# Patient Record
Sex: MALE | Race: WHITE | Employment: FULL TIME | URBAN - METROPOLITAN AREA
[De-identification: names, ages, dates, MRNs, and addresses within clinical notes are randomized per-mention and may not be internally consistent; named-entity substitution may affect disease eponyms.]

---

## 2017-02-14 ENCOUNTER — HOSPITAL ENCOUNTER (EMERGENCY)
Facility: HOSPITAL | Age: 22
Discharge: HOME | End: 2017-02-14
Attending: EMERGENCY MEDICINE
Payer: COMMERCIAL

## 2017-02-14 VITALS
BODY MASS INDEX: 32.32 KG/M2 | DIASTOLIC BLOOD PRESSURE: 74 MMHG | RESPIRATION RATE: 18 BRPM | HEIGHT: 65 IN | WEIGHT: 194 LBS | TEMPERATURE: 99 F | SYSTOLIC BLOOD PRESSURE: 138 MMHG | OXYGEN SATURATION: 100 % | HEART RATE: 81 BPM

## 2017-02-14 DIAGNOSIS — F32.A DEPRESSION, UNSPECIFIED DEPRESSION TYPE: Primary | ICD-10-CM

## 2017-02-14 LAB
ACETAMINOPHEN: <2 UG/ML (ref ?–2)
ALBUMIN SERPL-MCNC: 4.5 G/DL (ref 3.5–4.8)
ALP LIVER SERPL-CCNC: 90 U/L (ref 45–117)
ALT SERPL-CCNC: 20 U/L (ref 17–63)
AST SERPL-CCNC: 25 U/L (ref 15–41)
BARBITURATES URINE: NEGATIVE
BASOPHILS # BLD AUTO: 0.01 X10(3) UL (ref 0–0.1)
BASOPHILS NFR BLD AUTO: 0.1 %
BILIRUB SERPL-MCNC: 1 MG/DL (ref 0.1–2)
BUN BLD-MCNC: 10 MG/DL (ref 8–20)
CALCIUM BLD-MCNC: 9.6 MG/DL (ref 8.3–10.3)
CANNABINOID URINE: NEGATIVE
CHLORIDE: 102 MMOL/L (ref 101–111)
CO2: 28 MMOL/L (ref 22–32)
COCAINE URINE: NEGATIVE
CREAT BLD-MCNC: 1.09 MG/DL (ref 0.7–1.3)
EOSINOPHIL # BLD AUTO: 0.02 X10(3) UL (ref 0–0.3)
EOSINOPHIL NFR BLD AUTO: 0.2 %
ERYTHROCYTE [DISTWIDTH] IN BLOOD BY AUTOMATED COUNT: 12 % (ref 11.5–16)
ETHYL ALCOHOL: <3 MG/DL (ref ?–3)
GLUCOSE BLD-MCNC: 104 MG/DL (ref 70–99)
HCT VFR BLD AUTO: 48.1 % (ref 37–53)
HGB BLD-MCNC: 16.7 G/DL (ref 13–17)
IMMATURE GRANULOCYTE COUNT: 0.02 X10(3) UL (ref 0–1)
IMMATURE GRANULOCYTE RATIO %: 0.2 %
LYMPHOCYTES # BLD AUTO: 1.42 X10(3) UL (ref 0.9–4)
LYMPHOCYTES NFR BLD AUTO: 15.3 %
M PROTEIN MFR SERPL ELPH: 8.2 G/DL (ref 6.1–8.3)
MCH RBC QN AUTO: 29.1 PG (ref 27–33.2)
MCHC RBC AUTO-ENTMCNC: 34.7 G/DL (ref 31–37)
MCV RBC AUTO: 83.8 FL (ref 80–99)
MONOCYTES # BLD AUTO: 0.44 X10(3) UL (ref 0.1–0.6)
MONOCYTES NFR BLD AUTO: 4.7 %
NEUTROPHIL ABS PRELIM: 7.38 X10 (3) UL (ref 1.3–6.7)
NEUTROPHILS # BLD AUTO: 7.38 X10(3) UL (ref 1.3–6.7)
NEUTROPHILS NFR BLD AUTO: 79.5 %
OPIATE URINE: NEGATIVE
PCP URINE: NEGATIVE
PLATELET # BLD AUTO: 207 10(3)UL (ref 150–450)
POTASSIUM SERPL-SCNC: 4.3 MMOL/L (ref 3.6–5.1)
RBC # BLD AUTO: 5.74 X10(6)UL (ref 4.3–5.7)
RED CELL DISTRIBUTION WIDTH-SD: 36.4 FL (ref 35.1–46.3)
SALICYLATE: <1.7 MG/DL (ref ?–1.7)
SODIUM SERPL-SCNC: 137 MMOL/L (ref 136–144)
WBC # BLD AUTO: 9.3 X10(3) UL (ref 4–13)

## 2017-02-14 PROCEDURE — 80329 ANALGESICS NON-OPIOID 1 OR 2: CPT | Performed by: EMERGENCY MEDICINE

## 2017-02-14 PROCEDURE — 80307 DRUG TEST PRSMV CHEM ANLYZR: CPT | Performed by: EMERGENCY MEDICINE

## 2017-02-14 PROCEDURE — 99284 EMERGENCY DEPT VISIT MOD MDM: CPT

## 2017-02-14 PROCEDURE — 80346 BENZODIAZEPINES1-12: CPT | Performed by: EMERGENCY MEDICINE

## 2017-02-14 PROCEDURE — 99285 EMERGENCY DEPT VISIT HI MDM: CPT

## 2017-02-14 PROCEDURE — 80320 DRUG SCREEN QUANTALCOHOLS: CPT | Performed by: EMERGENCY MEDICINE

## 2017-02-14 PROCEDURE — 85025 COMPLETE CBC W/AUTO DIFF WBC: CPT | Performed by: EMERGENCY MEDICINE

## 2017-02-14 PROCEDURE — 80324 DRUG SCREEN AMPHETAMINES 1/2: CPT | Performed by: EMERGENCY MEDICINE

## 2017-02-14 PROCEDURE — 36415 COLL VENOUS BLD VENIPUNCTURE: CPT

## 2017-02-14 PROCEDURE — 80053 COMPREHEN METABOLIC PANEL: CPT | Performed by: EMERGENCY MEDICINE

## 2017-02-14 RX ORDER — LORAZEPAM 1 MG/1
1 TABLET ORAL ONCE
Status: COMPLETED | OUTPATIENT
Start: 2017-02-14 | End: 2017-02-14

## 2017-02-14 NOTE — ED NOTES
Pt becoming upset about waiting for assessment. Pt expressing frustration about missing school this morning, and what looks to be work as well. Pt states he is not suicidal and wants to leave.   Told pt I would let his nurse know and that the [facility] represent

## 2017-02-14 NOTE — ED PROVIDER NOTES
Patient Seen in: [facility] Emergency Department    History   Patient presents with:  Eval-P (psychiatric)    Stated Complaint: suicidal ideations    HPI    This is a 17-year-old male who arrives by paramedics the patient was at his psychiatrist's o SURGICAL HISTORY  right knee repair    Comment 2009    OTHER SURGICAL HISTORY  5/18/12    Comment right shoulder a post labral and capsular repair    OTHER SURGICAL HISTORY  5/6/16    Comment carpal tunnel left wrist       Medications :   temazepam 30 MG O skin 2 (two) times daily as needed. testosterone cypionate (DEPOTESTOTERONE) 200 MG/ML Intramuscular Oil,  Inject 0.5 mL into the muscle once a week.  Indications: Sex Reversal  Patient taking differently: Inject 0.6 mg into the muscle See Admin Instructi icterus. Oral mucosa Is wet. No facial trauma. The neck is supple. LUNGS: Clear to auscultation, there is no wheezing or retraction. No crackles. CV: Cardiovascular is regular without murmurs or rubs.     ABD: The abdomen is soft nondistended nont within normal limits. Salicylate, acetaminophen, alcohol was negative  , CBC was within normal limits drug screen negative. Except for amphetamines and benzos. The patient was seen by Lizbeth Flores .   The patient is not actively suicidal he wa

## 2017-02-14 NOTE — ED NOTES
Spoke to [place] at [facility], states it is still going to be a bit until patient will be seen. There are three ahead of him and he will probably be seen at 1300.

## 2017-02-14 NOTE — ED PROVIDER NOTES
Hilda Fry #[id number]  (16 year old M)        ED-C0-C0         Ricka Collet [facility] Counselor Signed  [facility] Comprehensive Assessment 2/14/2017  2:28 PM   Related encounter: Assessment from 2/14/2017 in [facility] [address] ADVISED MOM THAT PT HAS BEEN INCREASINGLY TALKING ABOUT SUIICDE.      Referral Source  Referral Source: [facility] Provider  Referral Source Info: Yadira Naylor MD:  Fermin Stanton MD  -  [facility]    Suicide Risk  Current/Recent Suicidal Ideation: Yes  Date or observed  Sleep Pattern: Difficulty falling asleep; Disturbed/interrupted sleep  Appetite Symptoms: Increased  Unplanned Weight Gain: Yes (comment)                 Current/Previous MH/CD Providers  Hospitalizations, Placements, Therapy, Detox: Yes  Andree Mccormick Systems: Family members  Living Arrangements: Parent(s) (W/ FATHER)  Type of Residence: Private residence    Abuse Assessment  Physical Abuse: Unable to assess  Verbal Abuse: Unable to assess  Sexual Abuse: Unable to assess  Neglect: Unable to assess  Does

## 2017-02-14 NOTE — ED INITIAL ASSESSMENT (HPI)
Per paramedics, pt was at his psychiatrists office and md was fearful that the patient was in danger of suicide. Upon pts arrival he is tearful, cooperative and denies wanting to commit suicide, but admits to standing by a train with thoughts of suicide las

## 2017-02-14 NOTE — ED NOTES
Went and spoke with the patient about his depression and gave him encouraging words so that he would not try to hurt himself.  He stated he was going to try to listen to all my advice and talk to someone if he was ever feeling so depressed that the idea of

## 2017-02-14 NOTE — ED NOTES
Pt is resting in the room and states he does not want anything to drink or eat he is just laying in the bed and looks very sad

## 2017-02-14 NOTE — ED NOTES
Spoke with Aspen Smith at [facility], the crisis counselor is on her way to assess the patient.

## 2017-02-14 NOTE — ED NOTES
Medicated the patient with PO ativan, will continue to monitor, VSS.  Still does not want anything to eat or drink but had some water with the PO ativan

## 2017-02-15 ENCOUNTER — NURSE ONLY (OUTPATIENT)
Dept: INTERNAL MEDICINE CLINIC | Facility: CLINIC | Age: 22
End: 2017-02-15

## 2017-02-15 DIAGNOSIS — Z11.1 PPD SCREENING TEST: Primary | ICD-10-CM

## 2017-02-15 LAB
AMPHETAMINE, URINE: >5000 NG/ML
METAMPHETAMINE, URINE: <200 NG/ML
METHYLENEDIOXYAMPHETAMINE: <200 NG/ML
METHYLENEDIOXYETHYLAMPHETAMINE: <200 NG/ML
METHYLENEDIOXYMETAMPHETAMINE: <200 NG/ML

## 2017-02-17 LAB
7-AMINOCLONAZEPAM, URINE: <5 NG/ML
ALPHA-HYDROXYALPRAZOLAM, URINE: 14 NG/ML
ALPHA-HYDROXYMIDAZOLAM, URINE: <20 NG/ML
ALPRAZOLAM, URINE: <5 NG/ML
CHLORDIAZEPOXIDE, URINE: <20 NG/ML
CLONAZEPAM, URINE: <5 NG/ML
DIAZEPAM, URINE: 22 NG/ML
LORAZEPAM, URINE: <20 NG/ML
MIDAZOLAM, URINE: <20 NG/ML
NORDIAZEPAM, URINE: <20 NG/ML
OXAZEPAM, URINE: 3826 NG/ML
TEMAZEPAM, URINE: >4000 NG/ML

## 2017-02-21 ENCOUNTER — NURSE ONLY (OUTPATIENT)
Dept: INTERNAL MEDICINE CLINIC | Facility: CLINIC | Age: 22
End: 2017-02-21

## 2017-02-21 DIAGNOSIS — Z11.1 SCREENING-PULMONARY TB: Primary | ICD-10-CM

## 2017-02-21 LAB — INDURATION (): 0 MM (ref 0–11)

## 2017-02-21 PROCEDURE — 86580 TB INTRADERMAL TEST: CPT | Performed by: INTERNAL MEDICINE

## 2017-02-23 ENCOUNTER — NURSE ONLY (OUTPATIENT)
Dept: INTERNAL MEDICINE CLINIC | Facility: CLINIC | Age: 22
End: 2017-02-23

## 2017-03-13 ENCOUNTER — LAB ENCOUNTER (OUTPATIENT)
Dept: LAB | Facility: HOSPITAL | Age: 22
End: 2017-03-13
Attending: FAMILY MEDICINE
Payer: COMMERCIAL

## 2017-03-13 DIAGNOSIS — E34.9 ENDOCRINE DISEASE: Primary | ICD-10-CM

## 2017-03-13 LAB
ALBUMIN SERPL-MCNC: 4 G/DL (ref 3.5–4.8)
ALP LIVER SERPL-CCNC: 77 U/L (ref 45–117)
ALT SERPL-CCNC: 14 U/L (ref 17–63)
AST SERPL-CCNC: 16 U/L (ref 15–41)
BASOPHILS # BLD AUTO: 0.01 X10(3) UL (ref 0–0.1)
BASOPHILS NFR BLD AUTO: 0.2 %
BILIRUB SERPL-MCNC: 1.4 MG/DL (ref 0.1–2)
BUN BLD-MCNC: 12 MG/DL (ref 8–20)
CALCIUM BLD-MCNC: 9.5 MG/DL (ref 8.3–10.3)
CHLORIDE: 104 MMOL/L (ref 101–111)
CHOLEST SMN-MCNC: 152 MG/DL (ref ?–190)
CO2: 30 MMOL/L (ref 22–32)
CREAT BLD-MCNC: 1.11 MG/DL (ref 0.7–1.3)
EOSINOPHIL # BLD AUTO: 0.04 X10(3) UL (ref 0–0.3)
EOSINOPHIL NFR BLD AUTO: 0.6 %
ERYTHROCYTE [DISTWIDTH] IN BLOOD BY AUTOMATED COUNT: 12.1 % (ref 11.5–16)
EST. AVERAGE GLUCOSE BLD GHB EST-MCNC: 94 MG/DL (ref 68–126)
ESTRADIOL: 33.9 PG/ML (ref 0–39.8)
GLUCOSE BLD-MCNC: 89 MG/DL (ref 70–99)
HBA1C MFR BLD HPLC: 4.9 % (ref ?–5.7)
HCT VFR BLD AUTO: 45.4 % (ref 37–53)
HDLC SERPL-MCNC: 51 MG/DL (ref 45–?)
HDLC SERPL: 2.98 {RATIO} (ref ?–4.97)
HGB BLD-MCNC: 15.7 G/DL (ref 13–17)
IMMATURE GRANULOCYTE COUNT: 0.01 X10(3) UL (ref 0–1)
IMMATURE GRANULOCYTE RATIO %: 0.2 %
LDLC SERPL CALC-MCNC: 88 MG/DL (ref ?–120)
LYMPHOCYTES # BLD AUTO: 1.96 X10(3) UL (ref 0.9–4)
LYMPHOCYTES NFR BLD AUTO: 31.2 %
M PROTEIN MFR SERPL ELPH: 7.4 G/DL (ref 6.1–8.3)
MCH RBC QN AUTO: 28.8 PG (ref 27–33.2)
MCHC RBC AUTO-ENTMCNC: 34.6 G/DL (ref 31–37)
MCV RBC AUTO: 83.3 FL (ref 80–99)
MONOCYTES # BLD AUTO: 0.42 X10(3) UL (ref 0.1–0.6)
MONOCYTES NFR BLD AUTO: 6.7 %
NEUTROPHIL ABS PRELIM: 3.84 X10 (3) UL (ref 1.3–6.7)
NEUTROPHILS # BLD AUTO: 3.84 X10(3) UL (ref 1.3–6.7)
NEUTROPHILS NFR BLD AUTO: 61.1 %
NONHDLC SERPL-MCNC: 101 MG/DL (ref ?–150)
PLATELET # BLD AUTO: 182 10(3)UL (ref 150–450)
POTASSIUM SERPL-SCNC: 4.2 MMOL/L (ref 3.6–5.1)
RBC # BLD AUTO: 5.45 X10(6)UL (ref 4.3–5.7)
RED CELL DISTRIBUTION WIDTH-SD: 36.1 FL (ref 35.1–46.3)
SODIUM SERPL-SCNC: 141 MMOL/L (ref 136–144)
TRIGLYCERIDES: 64 MG/DL (ref ?–115)
TSI SER-ACNC: 0.52 MIU/ML (ref 0.35–5.5)
VLDL: 13 MG/DL (ref 5–40)
WBC # BLD AUTO: 6.3 X10(3) UL (ref 4–13)

## 2017-03-13 PROCEDURE — 84402 ASSAY OF FREE TESTOSTERONE: CPT

## 2017-03-13 PROCEDURE — 36415 COLL VENOUS BLD VENIPUNCTURE: CPT

## 2017-03-13 PROCEDURE — 84403 ASSAY OF TOTAL TESTOSTERONE: CPT

## 2017-03-13 PROCEDURE — 82670 ASSAY OF TOTAL ESTRADIOL: CPT

## 2017-03-13 PROCEDURE — 83036 HEMOGLOBIN GLYCOSYLATED A1C: CPT

## 2017-03-13 PROCEDURE — 80053 COMPREHEN METABOLIC PANEL: CPT

## 2017-03-13 PROCEDURE — 84443 ASSAY THYROID STIM HORMONE: CPT

## 2017-03-13 PROCEDURE — 80061 LIPID PANEL: CPT

## 2017-03-13 PROCEDURE — 85025 COMPLETE CBC W/AUTO DIFF WBC: CPT

## 2017-03-19 LAB
TESTOSTERONE TOTAL: 597 NG/DL
TESTOSTERONE, FREE -MS/MS: 157 PG/ML

## 2017-03-28 ENCOUNTER — APPOINTMENT (OUTPATIENT)
Dept: GENERAL RADIOLOGY | Facility: HOSPITAL | Age: 22
End: 2017-03-28
Attending: EMERGENCY MEDICINE
Payer: COMMERCIAL

## 2017-03-28 PROCEDURE — 71010 XR CHEST AP PORTABLE  (CPT=71010): CPT

## 2017-03-28 NOTE — ED INITIAL ASSESSMENT (HPI)
Patient reported to his MD that he had taken 40 xanax tabs. EMS observed unsteady gait at the scene. Patient was combative with police. Patient arrived in handcuffs and leather restraints.  Security and [facility] police present

## 2017-03-28 NOTE — ED PROVIDER NOTES
Patient Seen in: [facility] Emergency Department    History   Patient presents with:  Eval-P (psychiatric)    Stated Complaint: Patient reported that he took 40 xanax tabs, medics observed unsteady gait.  Paco Peoples*    HPI    10-year-old male who comes in Javi Kern MD;  Location: [place] [address]  right shoulder repair    Comment 2010    OTHER SURGICAL HISTORY  right knee repair    Comment 2009    OTHER SURGICAL HISTORY  5/18/12    Comment right shoulder a post labral and caps mg by mouth every 6 (six) hours as needed for Pain. Diclofenac Sodium (PENNSAID) 2 % Transdermal Solution,  Place 20 mg onto the skin 2 (two) times daily as needed.    testosterone cypionate (DEPOTESTOTERONE) 200 MG/ML Intramuscular Oil,  Inject 0.5 mL in and rhythm  Lungs: Clear to auscultation bilaterally  Abdomen: Soft nontender nondistended normal active bowel sounds without rebound, guarding or masses noted  Back nontender without CVA tenderness  Extremity no clubbing, cyanosis or edema noted.   Full ra

## 2017-03-29 NOTE — ED NOTES
Patient is now calm, no resistance with restraint loosening. Redness was noticed so security present to assist with clothing removal and restraint loosening.

## 2017-03-29 NOTE — BH LEVEL OF CARE ASSESSMENT
Comprehensive Assessment Note   General Questions   Why are you here?: [facility] pt stated \"I don't know. \" After being asked about him making statements earlier about taking 40 xanax as a SI attempt pt stated he took 15 pill discharged from the hospital.   Family Collateral   Family Collateral: none available   Reason Patient is Here Today: na   Family's Biggest Areas of Concern: na   Referral Source   Referral Source:  [facility]: [facility]   Person/Contac Past Expectation: pt refused to discuss   Past Suicide Risk Mitigating Factors: pt refused to discuss   Past Suicide Risk Collateral Provided By[de-identified] na   Describe Past Suicide Risk Collateral: na   Danger to Others/Property   Current/Recent Harm Toward Other Self-Injurious Behaviors: hx of self-harm \"months ago\"   Present Self-Injurious Behaviors: No   Mental Health Symptoms   Hallucination Type: No problems reported or observed   Delusions: No problems reported or observed   Depression Symptoms: Feelings of Last Seen: 3 yrs ago           Current/Previous MH/CD Treatment   Recovery Support Groups: Denies Past History   History of Seclusion/Restraint: Yes (per previous assessment)   Addictions Screen   Do you sometimes drink beer, wine or other alcohol beverage Characteristics: Normal rate;Normal rhythm;Normal volume   Concentration: Unimpaired   Memory: Recent memory intact; Remote memory intact   Orientation Level: Oriented X4   Thought Characteristics: Alert; Coherent;Logical;Organized   Judgment: Poor (Comment) distress or anguish: Yes   5. Self-loathing: Yes   6. Hopelessness: Yes   7. Agitation: Yes   8. Psychosis: No   9. View of death: Yes   10. Severe relational or situational stressors: Yes   Patient History   11. Family/Peer Suicidal History: No   12.  Poor

## 2017-03-29 NOTE — ED NOTES
Restraints removed, patient more calm and cooperative.  [facility] notified of crisis eval

## 2017-03-30 PROBLEM — F33.2 MDD (MAJOR DEPRESSIVE DISORDER), RECURRENT EPISODE, SEVERE (HCC): Status: ACTIVE | Noted: 2017-03-30

## 2017-04-25 ENCOUNTER — OFFICE VISIT (OUTPATIENT)
Dept: INTERNAL MEDICINE CLINIC | Facility: CLINIC | Age: 22
End: 2017-04-25

## 2017-04-25 ENCOUNTER — TELEPHONE (OUTPATIENT)
Dept: INTERNAL MEDICINE CLINIC | Facility: CLINIC | Age: 22
End: 2017-04-25

## 2017-04-25 VITALS
WEIGHT: 191 LBS | RESPIRATION RATE: 16 BRPM | HEIGHT: 65 IN | HEART RATE: 64 BPM | TEMPERATURE: 98 F | BODY MASS INDEX: 31.82 KG/M2 | DIASTOLIC BLOOD PRESSURE: 72 MMHG | SYSTOLIC BLOOD PRESSURE: 128 MMHG

## 2017-04-25 DIAGNOSIS — F33.0 MILD EPISODE OF RECURRENT MAJOR DEPRESSIVE DISORDER (HCC): ICD-10-CM

## 2017-04-25 DIAGNOSIS — S09.90XA HEAD INJURY, ACUTE, INITIAL ENCOUNTER: Primary | ICD-10-CM

## 2017-04-25 DIAGNOSIS — IMO0002 INJURY, SELF-INFLICTED: ICD-10-CM

## 2017-04-25 PROCEDURE — 99213 OFFICE O/P EST LOW 20 MIN: CPT | Performed by: NURSE PRACTITIONER

## 2017-04-25 NOTE — TELEPHONE ENCOUNTER
Pt states that last night at 9pm he hit his forehead against the face and had some bleeding and bruising. Pt had a h/a after and nausea after and took ibuprofen otc that helped with h/a and bleeding stopped.    Pt denied LOC, lightheadedness, dizziness or v

## 2017-04-25 NOTE — PROGRESS NOTES
Patient presents with:  Head Injury: Pt states he \"hit head on fence\" last night- Pt denies any LOC, blurred vision or emesis, Pt states he did have several boughts of nausea ovenight      HPI:  Presents with 1 day history of self inflicted head injury. Active Problem List:     Chondromalacia of patella     Disorder of bursae and tendons in shoulder region     Superior glenoid labrum lesion     Other joint derangement, not elsewhere classified, shoulder region     Other specified disorders of rotator cuff haloperidol 2 MG Oral Tab Take 1 tablet (2 mg total) by mouth 3 (three) times daily. Disp: 45 tablet Rfl: 0   finasteride 5 MG Oral Tab Take 5 mg by mouth daily.  Disp:  Rfl:    LEVOTHYROXINE SODIUM 88 MCG Oral Tab TAKE 1 TABLET BEFORE BREAKFAST ON SUNDAY without exudate, bleeding or surrounding erythema. Abrasion surrounded by approx 6 cm circular area of edema which is soft-fluctuant, mildly tender, without erythema or bruising. Neuro: Cranial nerves II-XII intact.    Eyes: Conjunctivae are normal. PERRL

## 2017-04-27 RX ORDER — MONTELUKAST SODIUM 10 MG/1
TABLET ORAL
Qty: 90 TABLET | Refills: 0 | Status: SHIPPED | OUTPATIENT
Start: 2017-04-27 | End: 2017-08-07

## 2017-05-10 NOTE — BH LEVEL OF CARE ASSESSMENT
Level of Care Assessment Note  General Questions   Why are you here?: \"I saw Dr. Binh Becerra yesterday and he said he's going to a concert tonight and that I couldn't talk to him tonight so I got upset. He told me to talk to [facility] VICKY (therapist) and I did.  I have denies SI/HI. Family Collateral   Family Collateral: none available   Reason Patient is Here Today: na   Family's Biggest Areas of Concern: na   Referral Source   Referral Source:  Other [address]: [facility]   Person/Contact Name: Juju Shea to Others/Property   Current/Recent Harm Toward Others: No   Current/Recent Harm Toward Others Ideation: No   Current/Recent Harm Toward Others Plan : No   Current/Recent Harm Toward Others Intent: No   Current/Recent Harm Toward Others Rehearsal: No   Cur Severity: superficial   Date of Most Recent Occurence: (2 wks ago)   Mental Health Symptoms   Hallucination Type: No problems reported or observed   Delusions: No problems reported or observed   Depression Symptoms: Feelings of worthlessness; Feelings of ho help so he's seeing Quintin Medina; pt states he doesn't want a therapist but Dr. Frances Morgan wants him to   Prior [facility] Inpatient   Name: Yolande Alvares   Dates of Treatment: multiple x's; last admission was 3/29-4/03/17   Date Last Seen: 4/3/17   Reason: SI attempt   Prior LO being released from the hospital; pt states was falling behind before the last hospitalization and has \"been hard to bounce back\"; pt states doesn't have motivation and attention for about 3 months   Employment Status: Employed (pt states he has 3 jobs; Oriented X4   Thought Characteristics: Alert;Logical;Coherent;Organized   Judgment: Fair   Insight: Fair   Assessment Summary   Assessment Summary: Pt is a 23 yr old male who arrived to the ER via ambulance.  Pt has a hx of depression and borderline persona

## 2017-05-10 NOTE — ED INITIAL ASSESSMENT (HPI)
PT arrives with medics. Pt was on the phone with his therapist and made a comment that was threatening suicide. Pt then hung up the phone and therapist called 911. Pt denies SI at this time.

## 2017-05-10 NOTE — ED PROVIDER NOTES
Patient Seen in: [facility] Emergency Department    History   Patient presents with:  Eval-P (psychiatric)    Stated Complaint: eval p    HPI    Patient is a 77-year-old presenting for evaluation after making suicidal statements prior to arrival.  P right shoulder a post labral and capsular repair    OTHER SURGICAL HISTORY  5/6/16    Comment carpal tunnel left wrist       Medications :   amphetamine-dextroamphetamine (ADDERALL) 20 MG Oral Tab,  1 in AM, 1/2 noon   MONTELUKAST SODIUM 10 MG Oral Tab,  T Father    • OCD Father    • Mental Disorder Father      anxiety   • Heart Disorder Father    • Cancer Mother      thyroid   • Mental Disorder Mother      anxiety   • Anxiety Mother    • Anxiety Maternal Grandmother    • Anxiety Maternal Grandfather    • Bi The patient moves all 4 extremities freely. No cyanosis, clubbing, or edema. NEUROLOGIC: The patient is awake, alert, and oriented x3. Cranial nerves are grossly intact. There is no gross motor or sensory deficits identified.   PSYCH: Patient admits to m

## 2017-06-30 ENCOUNTER — TELEPHONE (OUTPATIENT)
Dept: INTERNAL MEDICINE CLINIC | Facility: CLINIC | Age: 22
End: 2017-06-30

## 2017-07-07 ENCOUNTER — TELEPHONE (OUTPATIENT)
Dept: INTERNAL MEDICINE CLINIC | Facility: CLINIC | Age: 22
End: 2017-07-07

## 2017-07-07 DIAGNOSIS — Z23 NEED FOR TDAP VACCINATION: Primary | ICD-10-CM

## 2017-07-07 NOTE — TELEPHONE ENCOUNTER
LOV 12/22/16 JV    Pt was seen at ED 5/9/17 - no f/u appt     Last Tdap 8/31/16- Orders pended for approval

## 2017-07-14 ENCOUNTER — NURSE ONLY (OUTPATIENT)
Dept: INTERNAL MEDICINE CLINIC | Facility: CLINIC | Age: 22
End: 2017-07-14

## 2017-07-14 PROCEDURE — 90471 IMMUNIZATION ADMIN: CPT | Performed by: NURSE PRACTITIONER

## 2017-07-14 PROCEDURE — 90715 TDAP VACCINE 7 YRS/> IM: CPT | Performed by: NURSE PRACTITIONER

## 2017-09-22 NOTE — BH PROGRESS NOTE
Dr. Sunday Desai called and let this writer know about doing a wellness check due to pt calling making suicidal threats and having a knife.  Called 158, [place] police dept transferred to [address] and at pt's home when got off the phone with this wri

## 2017-11-20 ENCOUNTER — LAB ENCOUNTER (OUTPATIENT)
Dept: LAB | Age: 22
End: 2017-11-20
Attending: INTERNAL MEDICINE
Payer: COMMERCIAL

## 2017-11-20 DIAGNOSIS — E06.3 CHRONIC LYMPHOCYTIC THYROIDITIS: ICD-10-CM

## 2017-11-20 DIAGNOSIS — E34.9 ENDOCRINE DISEASE: Primary | ICD-10-CM

## 2017-11-20 PROCEDURE — 84439 ASSAY OF FREE THYROXINE: CPT

## 2017-11-20 PROCEDURE — 85025 COMPLETE CBC W/AUTO DIFF WBC: CPT

## 2017-11-20 PROCEDURE — 36415 COLL VENOUS BLD VENIPUNCTURE: CPT

## 2017-11-20 PROCEDURE — 82670 ASSAY OF TOTAL ESTRADIOL: CPT

## 2017-11-20 PROCEDURE — 84403 ASSAY OF TOTAL TESTOSTERONE: CPT

## 2017-11-20 PROCEDURE — 84443 ASSAY THYROID STIM HORMONE: CPT

## 2017-12-05 NOTE — BH LEVEL OF CARE ASSESSMENT
Level of Care Assessment Note    General Questions  Why are you here?: Pt is a 23 yr old male who arrived to [facility] via ambulance due to reporting SI statements in his therapist's office.  Pt states \"I'm pissed at [facility] VICKY (therapist) for a week now and he active. 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 Pt states he wants to go home because he has homework to do. Pt works part-time and denies issues at work.  Pt states he has been working more Ideation: 12/04/17  Describe Current/Recent Suicidal Ideation: Pt states he's been feeling more suicidal now but not active. // Pt's therapist called 911 due to pt making SI statements at the therapist's office. Pt reported SI with plan to slit his wrists. and was sent a letter re: the arrest in May 2017  Past Harm Toward Others Ideation: No  Past Harm Toward Others Plan: No  Past Harm Toward Others Intent: No  Past Harm Toward Others Rehearsal: No  Past Harm Toward Others Threat/Attempt: Yes  Date of Past H off  Object(s) Used:  Other (comment)  Area(s) of Body Injured: Leg  Describe Area(s) of Body Injured: leg  Frequency: Other (comment)  Describe Frequency: per previous assessment, frequency varies  Severity: Superficial  Describe Severity: superficial  Dominic (comment)  Unplanned Weight Gain: No  History of Eating Disorder: No  Active Eating Disorder: No                 Current/Previous MH/CD Providers  Hospitalizations, Placements, Therapy, Detox: Yes  Current OP Psychiatrist  Current OP Psychiatrist: Dr. Nat Tanner before he can continue with this major  Employment Status: Employed (part-time)  Job Issues:  Other (comment) (pt denies issues )  Concerns/Conflicts with Social Relationships: Yes  Describe Concerns/Conflicts with Social Relationships[de-identified] pt reports arguing they were arguing. Pt states he told his therapist \"I said don't worry if I try to kill myself\" and \"I told him I wouldn't overdose, I'd slit my wrists. \" Pt denies active SI and states he wants to go home because he has homework and has to work tomorro No

## 2017-12-05 NOTE — ED INITIAL ASSESSMENT (HPI)
Pt to ED brought by EMS for alejandro rodriguez. Per EMS report, Pt was seen by Psychiatrist this PM and EMS and [place] PONCE responded to the office after Pt verbalized that he \"wants to harm himself / kill himself by cutting his wrists\".  Pt denies this at this

## 2017-12-05 NOTE — ED NOTES
PATIENT'S BELONGINGS IN BAG [id number]. INCLUDE A CELL PHONE AND A WALLET. ALSO A BACKPACK UNDERNEATH A POD DESK.

## 2017-12-05 NOTE — ED PROVIDER NOTES
Patient Seen in: [facility] Emergency Department    History   Patient presents with:  Eval-P (psychiatric)    Stated Complaint: eval P    HPI    Patient presents to ER with the history per paramedics/police officers that they were called to respond wrist  5/6/2016: [facility] N/CARPAL TUNNEL SURG Left      Comment: Procedure: CARPAL TUNNEL RELEASE;  Surgeon:                Cris Mijares MD;  Location: [address] [place] [address],[address]        Smoking status: Never Smoker case was discussed with his psychiatrist who knows him very well and they recommended that he follow-up as an outpatient.   In our best estimation this patient is a reasonable risk for discharge and close outpatient follow-up, per the psychiatrist who knows

## 2017-12-05 NOTE — ED NOTES
Pt requesting to get his homework to he can work on it. Relayed to Pt that per ER MD, Pt can't have anything at this time (homework). Pt insisting he needs to do his homework since he has school in the morning. \"Can I just do it in my laptop? \" Pt was not

## 2018-03-06 ENCOUNTER — OFFICE VISIT (OUTPATIENT)
Dept: INTERNAL MEDICINE CLINIC | Facility: CLINIC | Age: 23
End: 2018-03-06

## 2018-03-06 VITALS
WEIGHT: 197 LBS | RESPIRATION RATE: 16 BRPM | HEIGHT: 65 IN | TEMPERATURE: 98 F | HEART RATE: 80 BPM | BODY MASS INDEX: 32.82 KG/M2 | SYSTOLIC BLOOD PRESSURE: 130 MMHG | DIASTOLIC BLOOD PRESSURE: 80 MMHG

## 2018-03-06 DIAGNOSIS — J45.20 MILD INTERMITTENT ASTHMA WITHOUT COMPLICATION: Primary | ICD-10-CM

## 2018-03-06 PROCEDURE — 99213 OFFICE O/P EST LOW 20 MIN: CPT | Performed by: INTERNAL MEDICINE

## 2018-03-06 RX ORDER — MONTELUKAST SODIUM 10 MG/1
10 TABLET ORAL DAILY
Qty: 90 TABLET | Refills: 3 | Status: SHIPPED | OUTPATIENT
Start: 2018-03-06 | End: 2019-04-01

## 2018-03-06 RX ORDER — DEXTROAMPHETAMINE SACCHARATE, AMPHETAMINE ASPARTATE, DEXTROAMPHETAMINE SULFATE AND AMPHETAMINE SULFATE 3.75; 3.75; 3.75; 3.75 MG/1; MG/1; MG/1; MG/1
TABLET ORAL DAILY
COMMUNITY
End: 2018-03-06 | Stop reason: CLARIF

## 2018-03-06 RX ORDER — TESTOSTERONE CYPIONATE 200 MG/ML
INJECTION INTRAMUSCULAR
Refills: 3 | COMMUNITY
Start: 2018-01-03

## 2018-03-06 RX ORDER — ALBUTEROL SULFATE 90 UG/1
2 AEROSOL, METERED RESPIRATORY (INHALATION) EVERY 4 HOURS PRN
Qty: 2 INHALER | Refills: 0 | Status: SHIPPED | OUTPATIENT
Start: 2018-03-06 | End: 2019-01-08

## 2018-03-06 NOTE — PROGRESS NOTES
Patient presents with:  Medication Follow-Up: AB RM 9, pt states having asthma attack       HPI:  Her efor f/u from asthma exacerbation which sarted last week, ran out of singulair and inhlaer. Notes wheezing, cough, now improved. No f/c/chest pain or sob. DERRICK/CHRISTA/  /NSC/RIGHT CARPAL TUNNEL RELEASE / DOS 12/13/16 / EXP 03/13/17     Left carpal tunnel syndrome     Major depression     Infectious mononucleosis without complication     [facility]     Bipolar 1 disorde Disp:  Rfl:    Albuterol Sulfate (VENTOLIN) (2.5 MG/3ML) 0.083% Inhalation Nebu Soln Take 3 mL (2.5 mg total) by nebulization every 6 (six) hours as needed for Wheezing.  Disp: 100 vial Rfl: 1   ibuprofen (MOTRIN) 600 MG Oral Tab Take 600 mg by mouth every Inhale 2 puffs into the lungs every 4 (four) hours as needed for Wheezing. Montelukast Sodium (SINGULAIR) 10 MG Oral Tab 90 tablet 3      Sig: Take 1 tablet (10 mg total) by mouth daily. Imaging & Consults:  None    No Follow-up on file.   Omer Richardson

## 2018-06-09 PROBLEM — F32.9 MDD (MAJOR DEPRESSIVE DISORDER): Status: ACTIVE | Noted: 2018-06-09

## 2018-06-13 NOTE — TELEPHONE ENCOUNTER
Spoke with Linus Pelayo at Dr Anna Marie Macdonald office, per AS ok to give cell phone number for Dr. Yun Osorio return call.  Linus Pelayo stating Dr. Yun Osorio will try to call in between patients but if not his clinic ends at [address] [place] [address]

## 2018-06-13 NOTE — TELEPHONE ENCOUNTER
Dr. Magali Rivera from [facility] called and wanted to inform AS that they had to terminate the pt from the practice due to the pt being very verbally abusive to the staff.  Pt is unable to see any of the [facility] providers anymore,     Please call Dr. Magali Rivera

## 2018-06-13 NOTE — TELEPHONE ENCOUNTER
Can daleone page Dr Jari Cooks for me or get a number that we can talk tomorrow? Did they refill meds, did they refer to a new practice?

## 2018-06-14 NOTE — TELEPHONE ENCOUNTER
Patient asking if AS could fill his meds weekly, keep the medications at the office, see pt Monday(only AS), pt works this weekend, wants to leave [facility] but they will not let him go without having an appt made with someone, trying to call MD's but they are n

## 2018-06-14 NOTE — TELEPHONE ENCOUNTER
I can help fill short term(91-2 mos), but kilo cayden is obligated to find him care for his psychiatric conditions.

## 2018-06-14 NOTE — TELEPHONE ENCOUNTER
Pt calling to ask if AS would take over Pt's med refills, Pt no longer working with Dr. Yanely Layne or any other doctor at [facility]. Pt was given other names, Pt is having trouble finding someone outside of the [facility] based on his insurance.   Pt is still in patient

## 2018-06-15 NOTE — TELEPHONE ENCOUNTER
LM for Maik Liu, pt's therapist to call back. Scheduled appt with pt for Monday 6/18/18 with AS at 3:45pm.  Pt aware.

## 2018-06-15 NOTE — TELEPHONE ENCOUNTER
I can see him Monday, make sure has accurate med list and then we may need to help find him psych to see; diony or kit?

## 2018-06-18 NOTE — TELEPHONE ENCOUNTER
Pt called to cxl todays appt stated he was suppose to have been discharged on Friday and wasn't. He is hopefully being discharged today. Pt would like an appt on Weds. Nothing is available. Ok to r.s with JV/SD or CB?  Please advise. '    Pt doesn't have hi

## 2018-06-18 NOTE — TELEPHONE ENCOUNTER
Called pt to reschedule on 6/20 w/ AS as requested. Pt verbalized understanding and agreed with POC.

## 2018-06-20 ENCOUNTER — OFFICE VISIT (OUTPATIENT)
Dept: INTERNAL MEDICINE CLINIC | Facility: CLINIC | Age: 23
End: 2018-06-20

## 2018-06-20 VITALS
TEMPERATURE: 99 F | DIASTOLIC BLOOD PRESSURE: 62 MMHG | WEIGHT: 196 LBS | HEIGHT: 65 IN | SYSTOLIC BLOOD PRESSURE: 104 MMHG | BODY MASS INDEX: 32.65 KG/M2 | HEART RATE: 100 BPM

## 2018-06-20 DIAGNOSIS — R94.31 ABNORMAL EKG: Primary | ICD-10-CM

## 2018-06-20 DIAGNOSIS — F90.9 ATTENTION DEFICIT HYPERACTIVITY DISORDER (ADHD), UNSPECIFIED ADHD TYPE: ICD-10-CM

## 2018-06-20 DIAGNOSIS — F33.2 SEVERE EPISODE OF RECURRENT MAJOR DEPRESSIVE DISORDER, WITHOUT PSYCHOTIC FEATURES (HCC): ICD-10-CM

## 2018-06-20 DIAGNOSIS — T14.91XA SUICIDE ATTEMPT (HCC): ICD-10-CM

## 2018-06-20 DIAGNOSIS — Z72.89 SELF-INJURIOUS BEHAVIOR: ICD-10-CM

## 2018-06-20 DIAGNOSIS — F41.1 GENERALIZED ANXIETY DISORDER: ICD-10-CM

## 2018-06-20 PROCEDURE — 99214 OFFICE O/P EST MOD 30 MIN: CPT | Performed by: INTERNAL MEDICINE

## 2018-06-20 PROCEDURE — 1111F DSCHRG MED/CURRENT MED MERGE: CPT | Performed by: INTERNAL MEDICINE

## 2018-06-20 PROCEDURE — 93000 ELECTROCARDIOGRAM COMPLETE: CPT | Performed by: INTERNAL MEDICINE

## 2018-06-20 NOTE — PROGRESS NOTES
Patient presents with:  Hospital F/U: [facility]. Room 10. Was discharged from [facility] this morning. Appt on 6/29/18 with psychiatrist      HPI:  Here for hospital discharge f/u from suicide attempt/threats.  Pt seed dr Tyrell Mccabe, was now discharged due to [facility] left wrist, initial encounter     Major depression, recurrent ([facility]     Self-injurious behavior     SX/GLOBAL/  /NSC/RIGHT CARPAL TUNNEL RELEASE / DOS 12/13/16 / EXP 03/13/17     Left carpal tunnel syndrome     Major depression     Infectious mon Take 1 tablet (100 mcg total) by mouth before breakfast. Disp: 90 tablet Rfl: 0   Levothyroxine Sodium 88 MCG Oral Tab Take 1 tablet before breakfast on Sunday, Monday, Wednesday, and Friday Disp: 64 tablet Rfl: 0   finasteride 5 MG Oral Tab Take 5 mg by m encounter    Imaging & Consults:  ELECTROCARDIOGRAM, COMPLETE    No Follow-up on file. There are no Patient Instructions on file for this visit. All questions were answered and the patient understands the plan.

## 2018-06-28 ENCOUNTER — TELEPHONE (OUTPATIENT)
Dept: INTERNAL MEDICINE CLINIC | Facility: CLINIC | Age: 23
End: 2018-06-28

## 2018-06-28 NOTE — TELEPHONE ENCOUNTER
Sayda Lei RN             [facility],     I connected with this patient today re: behavioral health resources. He reported that he has an appointment scheduled with a new psychiatrist for next week.  He'll be seeing Dr. Keerthi Eaton at th

## 2018-07-23 ENCOUNTER — LAB ENCOUNTER (OUTPATIENT)
Dept: LAB | Facility: HOSPITAL | Age: 23
End: 2018-07-23
Attending: FAMILY MEDICINE
Payer: COMMERCIAL

## 2018-07-23 DIAGNOSIS — E34.9 ENDOCRINE DISEASE: Primary | ICD-10-CM

## 2018-07-23 LAB
ALBUMIN SERPL-MCNC: 3.9 G/DL (ref 3.5–4.8)
ALBUMIN/GLOB SERPL: 1 {RATIO} (ref 1–2)
ALP LIVER SERPL-CCNC: 93 U/L (ref 45–117)
ALT SERPL-CCNC: 52 U/L (ref 17–63)
ANION GAP SERPL CALC-SCNC: 8 MMOL/L (ref 0–18)
AST SERPL-CCNC: 43 U/L (ref 15–41)
BASOPHILS # BLD AUTO: 0.03 X10(3) UL (ref 0–0.1)
BASOPHILS NFR BLD AUTO: 0.4 %
BILIRUB SERPL-MCNC: 0.4 MG/DL (ref 0.1–2)
BUN BLD-MCNC: 14 MG/DL (ref 8–20)
BUN/CREAT SERPL: 13.6 (ref 10–20)
CALCIUM BLD-MCNC: 9.3 MG/DL (ref 8.3–10.3)
CHLORIDE SERPL-SCNC: 104 MMOL/L (ref 101–111)
CHOLEST SMN-MCNC: 174 MG/DL (ref ?–200)
CO2 SERPL-SCNC: 27 MMOL/L (ref 22–32)
CREAT BLD-MCNC: 1.03 MG/DL (ref 0.7–1.3)
EOSINOPHIL # BLD AUTO: 0.03 X10(3) UL (ref 0–0.3)
EOSINOPHIL NFR BLD AUTO: 0.4 %
ERYTHROCYTE [DISTWIDTH] IN BLOOD BY AUTOMATED COUNT: 12 % (ref 11.5–16)
ESTRADIOL: 40 PG/ML (ref 11–52.5)
GLOBULIN PLAS-MCNC: 3.8 G/DL (ref 2.5–3.7)
GLUCOSE BLD-MCNC: 103 MG/DL (ref 70–99)
HCT VFR BLD AUTO: 48.7 % (ref 37–53)
HDLC SERPL-MCNC: 61 MG/DL (ref 40–59)
HGB BLD-MCNC: 16.4 G/DL (ref 13–17)
IMMATURE GRANULOCYTE COUNT: 0.03 X10(3) UL (ref 0–1)
IMMATURE GRANULOCYTE RATIO %: 0.4 %
LDLC SERPL CALC-MCNC: 93 MG/DL (ref ?–100)
LYMPHOCYTES # BLD AUTO: 1.95 X10(3) UL (ref 0.9–4)
LYMPHOCYTES NFR BLD AUTO: 24.4 %
M PROTEIN MFR SERPL ELPH: 7.7 G/DL (ref 6.1–8.3)
MCH RBC QN AUTO: 28.7 PG (ref 27–33.2)
MCHC RBC AUTO-ENTMCNC: 33.7 G/DL (ref 31–37)
MCV RBC AUTO: 85.3 FL (ref 80–99)
MONOCYTES # BLD AUTO: 0.47 X10(3) UL (ref 0.1–1)
MONOCYTES NFR BLD AUTO: 5.9 %
NEUTROPHIL ABS PRELIM: 5.48 X10 (3) UL (ref 1.3–6.7)
NEUTROPHILS # BLD AUTO: 5.48 X10(3) UL (ref 1.3–6.7)
NEUTROPHILS NFR BLD AUTO: 68.5 %
NONHDLC SERPL-MCNC: 113 MG/DL (ref ?–130)
OSMOLALITY SERPL CALC.SUM OF ELEC: 289 MOSM/KG (ref 275–295)
PLATELET # BLD AUTO: 224 10(3)UL (ref 150–450)
POTASSIUM SERPL-SCNC: 4.1 MMOL/L (ref 3.6–5.1)
PROLACTIN: 12.8 NG/ML (ref 2.5–17.4)
RBC # BLD AUTO: 5.71 X10(6)UL (ref 4.3–5.7)
RED CELL DISTRIBUTION WIDTH-SD: 37.2 FL (ref 35.1–46.3)
SODIUM SERPL-SCNC: 139 MMOL/L (ref 136–144)
TESTOST SERPL-MCNC: 713.4 NG/DL (ref 241–827)
TRIGL SERPL-MCNC: 101 MG/DL (ref 30–149)
TSI SER-ACNC: 1.2 MIU/ML (ref 0.35–5.5)
VLDLC SERPL CALC-MCNC: 20 MG/DL (ref 0–30)
WBC # BLD AUTO: 8 X10(3) UL (ref 4–13)

## 2018-07-23 PROCEDURE — 84443 ASSAY THYROID STIM HORMONE: CPT

## 2018-07-23 PROCEDURE — 80061 LIPID PANEL: CPT

## 2018-07-23 PROCEDURE — 84403 ASSAY OF TOTAL TESTOSTERONE: CPT

## 2018-07-23 PROCEDURE — 80053 COMPREHEN METABOLIC PANEL: CPT

## 2018-07-23 PROCEDURE — 36415 COLL VENOUS BLD VENIPUNCTURE: CPT

## 2018-07-23 PROCEDURE — 85025 COMPLETE CBC W/AUTO DIFF WBC: CPT

## 2018-07-23 PROCEDURE — 82670 ASSAY OF TOTAL ESTRADIOL: CPT

## 2018-07-23 PROCEDURE — 84146 ASSAY OF PROLACTIN: CPT

## 2018-07-23 PROCEDURE — 84270 ASSAY OF SEX HORMONE GLOBUL: CPT

## 2018-07-24 LAB
SEX HORMONE BINDING GLOBULIN: 28 NMOL/L
TESTOSTERONE, ADULT, MALE: 726 NG/DL
TESTOSTERONE, BIOAVAILABLE: 451 NG/DL
TESTOSTERONE, FREE, CALC: 149 PG/ML
TESTOSTERONE, PERCENT FREE: 2.1 %

## 2018-08-03 ENCOUNTER — TELEPHONE (OUTPATIENT)
Dept: INTERNAL MEDICINE CLINIC | Facility: CLINIC | Age: 23
End: 2018-08-03

## 2018-08-03 NOTE — TELEPHONE ENCOUNTER
Called patient in regards to today's No Show visit. He states he did not receive a reminder call. I logged into [facility], and he did not receive a reminder call because it states he does not want reminder calls.     He scheduled for next available Friday

## 2018-08-18 NOTE — ED INITIAL ASSESSMENT (HPI)
Presents from [facility] for \"admission\", states he was sent to [facility] by his therapist and they did not have beds so they sent him to ED. Pt  Also c/o pain to rt wrist, injured during roller blading a week ago, no deformity noted.  Pt reports he has been feeling o

## 2018-08-18 NOTE — ED NOTES
Audra Croft #[id number]  (17 year old M)     [facility] ED-C3-C3   BRENNAN Blankenship RRC Counselor Signed Case Management   Level of Care Assessment Date of Service: 8/18/2018  6:17 PM   Related encounter: Assessment from 8/18/2018 in [facility] 5a. Have you started to work out or worked out the details of how to kill yourself? (past 30 days): Yes  5b. Do you intend to carry out this plan? (past 30 days): Yes  6.  Have you ever done anything, started to do anything, or prepared to do anything to en Describe Destructive Behavior Toward Property: Reports a year ago he was destroying property in his former psychiatrist's office.      Access to Means  Has access to means to attempt suicide or harm others or property: Yes  Description of Access: Medicatio Anxiety Symptoms: Generalized;Panic attack; Shortness of breath;Palpitations; Other (Comment) (Crying, \"huge meltdown, screaming\")  Panic Attacks: Reports \"high anxiety\". Reports his last panic attack was almost a week ago.    Trauma Reaction: Hypervigila How often do you have a drink containing alcohol? : 2-4 times per month  Alcohol Use  Age at first use?: 18  Route: Oral  Average amount used? : 1x a week, 1 beer. Denies hard liquor. How long with this pattern of use?: Social drinking.    Last Use?: 8/17 Do you have any prior/current legal concerns?: Probation;Pending charges  Probation/ Name (if applicable): Probation until August of 2019. Reports having court every other month. Reports he kicked a  after a suicidal attempt.  Reports he go Content: Ordinary  Level of Consciousness: Alert  Level of Consciousness: Alert  Behavior  Exhibited behavior: Participated;Demanding     Assessment Summary  Assessment Summary: Preet Salinas is a 19 Y/O Male who presents to [facility] due to reporting he is grieving th Inpatient Criteria: Suicidal/homicidal risk; Severely decreased function;24 hr behavior monitoring; Failure at lowest level of care  Behavioral Precautions: Suicide; One to One  Medical Precautions: None  Refused Treatment: No  Transferred:  Yes     Primary Ps

## 2018-08-19 NOTE — ED NOTES
SLY- under review  [facility]- they are waiting to hear from their psychiatrist to consult with regarding the transfer request

## 2018-08-19 NOTE — ED PROVIDER NOTES
Patient Seen in: [facility] Emergency Department    History   Patient presents with:  Eval-P (psychiatric)    Stated Complaint:     HPI    Patient has been increasingly suicidal recently trying to overdose twice last week on Xanax and Ambien.   He ha Louisa        Smoking status: Never Smoker                                                              Smokeless tobacco: Never Used                      Alcohol use:  No                Review of Systems    Positive for stated complaint:   Other syste medical purposes. URINALYSIS WITH CULTURE REFLEX - Abnormal; Notable for the following:     Clarity Urine Hazy (*)     Protein Urine 30  (*)     Leukocyte Esterase Urine Moderate (*)     WBC Urine 11-20 (*)     Squamous Epi.  Cells Moderate (*)     Mucous

## 2018-08-19 NOTE — ED NOTES
Pt admits that he has been having suicidal thoughts for several days, and progressively getting worse, denies any attempts today but states he attempted last week to overdose on Xanax, and Ambien.  Pt further stated that he was released by his prior psychia

## 2018-08-19 NOTE — ED NOTES
EAS here to transfer pt to [facility] room 2310-1 (2 [place]). Spoke with Shane Buenrostro RN at receiving facility to confirm room availability. Report had been given to off-going RN earlier, SLICK Driver.

## 2018-08-19 NOTE — ED NOTES
Transferred to ER from [facility] for transfer out due to no clinically appropriate beds at [facility]. Assessment completed at [facility].

## 2018-08-19 NOTE — ED NOTES
Pt is sleeping, easily awakened from sleep, no complaints, vitals are stable. Pt was given nightly medication as per at home, except saphris ( not available in hospital pharmacy), pt is aware.  Copy of medication list was faxed to [facility] attention Lo

## 2018-08-19 NOTE — ED NOTES
Pt was received via EAS from Vladimir Larsen, pt is calm and cooperative, following direction well, pt is depressed and soft spoken , answering all questions appropriately. Pt requesting to call his psychiatrist for admission to [facility].  Pt presented to Jorge Ferguson

## 2018-08-19 NOTE — ED NOTES
Margarito Ferrer #[id number]  (17 year old M)     [facility] ED-C3-C3   [facility] Counselor Signed   [facility] Level of Care Assessment Date of Service: 8/18/2018 11:27 PM   Related encounter: Assessment from 8/18/2018 in [facility] 5a. Have you started to work out or worked out the details of how to kill yourself? (past 30 days): Yes  5b. Do you intend to carry out this plan? (past 30 days): Yes  6.  Have you ever done anything, started to do anything, or prepared to do anything to en Describe Destructive Behavior Toward Property: Reports a year ago he was destroying property in his former psychiatrist's office.      Access to Means  Has access to means to attempt suicide or harm others or property: Yes  Description of Access: Medicatio Anxiety Symptoms: Generalized;Panic attack; Shortness of breath;Palpitations; Other (Comment) (Crying, \"huge meltdown, screaming\")  Panic Attacks: Reports \"high anxiety\". Reports his last panic attack was almost a week ago.    Trauma Reaction: Hypervigila How often do you have a drink containing alcohol? : 2-4 times per month  Alcohol Use  Age at first use?: 18  Route: Oral  Average amount used? : 1x a week, 1 beer. Denies hard liquor. How long with this pattern of use?: Social drinking.    Last Use?: 8/17 Do you have any prior/current legal concerns?: Probation;Pending charges  Probation/ Name (if applicable): Probation until August of 2019. Reports having court every other month. Reports he kicked a  after a suicidal attempt.  Reports he go Content: Ordinary  Level of Consciousness: Alert  Level of Consciousness: Alert  Behavior  Exhibited behavior: Participated;Demanding     Assessment Summary  Assessment Summary: Taiwo Herzog is a 21 Y/O Male who presents to [facility] due to reporting he is grieving th Inpatient Criteria: Suicidal/homicidal risk; Severely decreased function;24 hr behavior monitoring; Failure at lowest level of care  Behavioral Precautions: Suicide; One to One  Medical Precautions: None  Refused Treatment: No  Transferred: Yes  Transfer Faci

## 2018-08-19 NOTE — ED NOTES
Pt has spoken with Phillip Mills, from [facility], and plan of care was discussed with pt. Labs and urine was collected and sent per orders. Pt continues to be cooperative, pt requested phone and was provided with one per MD approval and with supervision.  Pt offered food

## 2018-08-19 NOTE — ED NOTES
[facility]- clinical faxed  [facility]- no beds at this time, they advised to call back in a.m.     Awaiting calls from [facility] and [facility]

## 2018-08-31 ENCOUNTER — TELEPHONE (OUTPATIENT)
Dept: INTERNAL MEDICINE CLINIC | Facility: CLINIC | Age: 23
End: 2018-08-31

## 2018-08-31 NOTE — TELEPHONE ENCOUNTER
Form for  dept of  services. Pt would like for us to call once ready for . AS will need to complete NP/PA not valid per pt. Original placed in file folder at the .  Copy of form placed in AS file folder in the back P

## 2018-09-05 NOTE — TELEPHONE ENCOUNTER
S/w pt he stated he received form about 10 days ago. Form was filled out pt is aware and will  today. Form was placed at the .

## 2018-09-05 NOTE — TELEPHONE ENCOUNTER
Patient called to see if form has been filled out  He needs to pick it up today or will possibly loose his license

## 2018-09-05 NOTE — TELEPHONE ENCOUNTER
Left detailed message ok per hipaa to have pt call back and let us know when he got this form per AS. Pt has upcoming appt for 9/28/18.

## 2018-09-20 PROBLEM — S69.81XA TFC (TRIANGULAR FIBROCARTILAGE COMPLEX) INJURY, RIGHT, INITIAL ENCOUNTER: Status: ACTIVE | Noted: 2018-09-20

## 2018-09-28 ENCOUNTER — TELEPHONE (OUTPATIENT)
Dept: INTERNAL MEDICINE CLINIC | Facility: CLINIC | Age: 23
End: 2018-09-28

## 2018-09-28 NOTE — TELEPHONE ENCOUNTER
Pt arrived 13minutes late to his appt per AS to reschedule.  Pt rescheduled to   Future Appointments   Date Time Provider Nika Avalos   10/3/2018  2:00 PM EMELIA Valencia [facility]                        11/1/2018  8:00 AM Schriedel,

## 2018-10-01 ENCOUNTER — NURSE ONLY (OUTPATIENT)
Dept: INTERNAL MEDICINE CLINIC | Facility: CLINIC | Age: 23
End: 2018-10-01

## 2018-10-01 ENCOUNTER — TELEPHONE (OUTPATIENT)
Dept: INTERNAL MEDICINE CLINIC | Facility: CLINIC | Age: 23
End: 2018-10-01

## 2018-10-01 DIAGNOSIS — Z02.9 ADMINISTRATIVE ENCOUNTER: Primary | ICD-10-CM

## 2018-10-01 RX ORDER — NALTREXONE HYDROCHLORIDE 50 MG/1
50 TABLET, FILM COATED ORAL 2 TIMES DAILY
COMMUNITY

## 2018-10-01 NOTE — TELEPHONE ENCOUNTER
Pt was sched to see SD on wed 10/3 to fill out drivers license forms-she said to cx this appt as she does not know the patient and he should really see AS-she said for pt to fax us the forms or drop them off and have AS look at them since he may be able to

## 2018-10-01 NOTE — TELEPHONE ENCOUNTER
Pt will be dropping off new form from Regla Harris [address] [place] [address] at the  today, 10-1-2018. The new form has a different section 5, for the mental health provider. Can AS pls fill out his portion and get back to Pt?

## 2018-11-01 ENCOUNTER — TELEPHONE (OUTPATIENT)
Dept: INTERNAL MEDICINE CLINIC | Facility: CLINIC | Age: 23
End: 2018-11-01

## 2018-11-14 ENCOUNTER — HOSPITAL ENCOUNTER (EMERGENCY)
Facility: HOSPITAL | Age: 23
Discharge: NURSING, REHAB OR LONG-TERM CARE | End: 2018-11-15
Attending: EMERGENCY MEDICINE
Payer: COMMERCIAL

## 2018-11-14 DIAGNOSIS — F32.A DEPRESSION, UNSPECIFIED DEPRESSION TYPE: Primary | ICD-10-CM

## 2018-11-14 PROCEDURE — 85025 COMPLETE CBC W/AUTO DIFF WBC: CPT | Performed by: EMERGENCY MEDICINE

## 2018-11-14 PROCEDURE — 99285 EMERGENCY DEPT VISIT HI MDM: CPT

## 2018-11-14 PROCEDURE — 80329 ANALGESICS NON-OPIOID 1 OR 2: CPT | Performed by: EMERGENCY MEDICINE

## 2018-11-14 PROCEDURE — 36415 COLL VENOUS BLD VENIPUNCTURE: CPT

## 2018-11-14 PROCEDURE — 80307 DRUG TEST PRSMV CHEM ANLYZR: CPT | Performed by: EMERGENCY MEDICINE

## 2018-11-14 PROCEDURE — 87086 URINE CULTURE/COLONY COUNT: CPT | Performed by: EMERGENCY MEDICINE

## 2018-11-14 PROCEDURE — 80053 COMPREHEN METABOLIC PANEL: CPT | Performed by: EMERGENCY MEDICINE

## 2018-11-14 PROCEDURE — 81001 URINALYSIS AUTO W/SCOPE: CPT | Performed by: EMERGENCY MEDICINE

## 2018-11-14 PROCEDURE — 80320 DRUG SCREEN QUANTALCOHOLS: CPT | Performed by: EMERGENCY MEDICINE

## 2018-11-14 RX ORDER — MONTELUKAST SODIUM 10 MG/1
10 TABLET ORAL ONCE
Status: COMPLETED | OUTPATIENT
Start: 2018-11-14 | End: 2018-11-14

## 2018-11-14 RX ORDER — NEFAZODONE HYDROCHLORIDE 50 MG/1
100 TABLET ORAL ONCE
Status: COMPLETED | OUTPATIENT
Start: 2018-11-14 | End: 2018-11-14

## 2018-11-14 RX ORDER — NALTREXONE HYDROCHLORIDE 50 MG/1
50 TABLET, FILM COATED ORAL ONCE
Status: COMPLETED | OUTPATIENT
Start: 2018-11-14 | End: 2018-11-14

## 2018-11-14 RX ORDER — TEMAZEPAM 15 MG/1
30 CAPSULE ORAL ONCE
Status: COMPLETED | OUTPATIENT
Start: 2018-11-14 | End: 2018-11-14

## 2018-11-14 RX ORDER — HALOPERIDOL 2 MG/1
2 TABLET ORAL ONCE
Status: COMPLETED | OUTPATIENT
Start: 2018-11-14 | End: 2018-11-14

## 2018-11-15 VITALS
HEIGHT: 65 IN | BODY MASS INDEX: 33.82 KG/M2 | WEIGHT: 203 LBS | DIASTOLIC BLOOD PRESSURE: 72 MMHG | SYSTOLIC BLOOD PRESSURE: 117 MMHG | HEART RATE: 68 BPM | OXYGEN SATURATION: 98 % | RESPIRATION RATE: 18 BRPM | TEMPERATURE: 98 F

## 2018-11-15 RX ORDER — DIPHENHYDRAMINE HCL 25 MG
25 CAPSULE ORAL ONCE
Status: COMPLETED | OUTPATIENT
Start: 2018-11-15 | End: 2018-11-15

## 2018-11-15 NOTE — ED NOTES
[facility] unable to accept at this time. States that if placement is not found elsewhere to try back later this AM when they can re-evaluate their bed situation.     L/M for [facility]    No beds available at:  [facility]

## 2018-11-15 NOTE — BH LEVEL OF CARE ASSESSMENT
Level of Care Assessment Note    General Questions  Why are you here?: Pt here for reassessment after refusing voluntary admission yesterday 11/13/18. Pt saw therapist on Monday. Pt reports nothing has changed, but just doesnt feel any better.  Psychiatr done anything, started to do anything, or prepared to do anything to end your life? (lifetime): Yes  7.  How long ago did you do any of these?: Between three months and a year ago  Score -  OV: 9 - High Risk   Describe : Pt reports on 10/8 had thoughts of worthlessness; Feelings of helplessness; Change in energy level; Impaired concentration; Loss of interest;Isolative;Sleep disturbance  Depression Description: pt reports hasnt slept in last few days, no motivation, stays in bed all day.    Anxiety Symptoms: Gen alcohol? : 2-4 times per month  Alcohol Use  Age at first use?: 18  Route: Oral  Average amount used? : 1-2 beers twice per month  How long with this pattern of use?: pt states he will occasionally drink socially  Last Use?: unable to recall  Is your curre authorities    General Appearance  Characteristics: Poor hygiene  Eye Contact: Direct  Psychomotor Behavior  Gait/Movement: Normal  Abnormal movements: None  Posture: Slouched  Rate of Movement: Normal  Mood and Affect  Mood or Feelings: Depressed; Hopeless voluntary admission yesterday 11/13/18. pt saw therapist on Monday. Pt reports nothing has changed, but just doesnt feel any better. Psychiatrist intially recommended pt complete PHP at [facility] where his psychiatrist has privledges.  Pt then stated

## 2018-11-15 NOTE — ED INITIAL ASSESSMENT (HPI)
Patient presents from [facility] for medical clearance. He states he overdosed on 30 mg of xanax on Friday night. He went to [facility] this evening for evaluation of depression.  Currently denies SI.

## 2018-11-15 NOTE — ED NOTES
Per Kareem Hernandes, [facility] authorizes 5 days from 11/15-11/19 and will be reviewed on 11/19. Auth # is [id number]. Number for review on 11/19 is [phone].

## 2018-11-15 NOTE — ED NOTES
Awaiting call back from [facility]    L/NEETA for [employer]    No beds available at:  [facility] and [facility]

## 2018-11-15 NOTE — ED PROVIDER NOTES
Signed out by previous physician. Awaiting psychiatric placement. Vital signs have been stable. Has not had any medical complaints. Patient is medically cleared. Resting comfortably in the bed.

## 2018-11-15 NOTE — ED INITIAL ASSESSMENT (HPI)
[facility]- does not accept transfers at night  [facility]- medical/clinical faxed

## 2018-11-15 NOTE — ED NOTES
Spoke with pt to clarify recent suicidal behavior. Pt stated that he had overdosed on xanax both in early Oct 2018 and Nov 9 2018. He stated that he took 80mg of xanax in early Oct and 30mg on Nov 9 2018.

## 2018-11-15 NOTE — ED NOTES
[facility]-no beds  [facility]-no beds  [facility]-no beds  [facility]-Out of network  [facility]-no appropriate bed  [facility]  [facility] for review

## 2018-11-15 NOTE — ED NOTES
Patient calm and cooperative at this time. Belongings secured by security and placed in locker B4.  Patient still has small turtle stuffed animal. OK with MD.

## 2018-11-15 NOTE — ED NOTES
[facility] waiting of psychiatrist    L/M for [facility] - MARTINE Lopez    No beds available at:  [facility]    [facility] states to try back later this AM

## 2018-11-15 NOTE — ED PROVIDER NOTES
Patient Seen in: [facility] Emergency Department    History   Patient presents with:  Eval-P (psychiatric)    Stated Complaint: eval p    HPI    Patient is a 12-year-old male with history of depression.   Patient states Friday night he took 30, 1 mg use: No      Review of Systems    Positive for stated complaint: eval p  Other systems are as noted in HPI. Constitutional and vital signs reviewed. All other systems reviewed and negative except as noted above.     Physical Exam     ED Triage Vitals Esterase Urine Moderate (*)     WBC Urine 5-10 (*)     Squamous Epi.  Cells Moderate (*)     Mucous Urine 4+ (*)     All other components within normal limits   CBC W/ DIFFERENTIAL - Abnormal; Notable for the following components:    RBC 5.89 (*)     HGB 17

## 2018-11-30 NOTE — PROGRESS NOTES
Patient presents with:   Follow - Up: patient is here for follow up from [facility], room 9      HPI:  Here for f/u of recent er visit and stay at [facility] in patient psych for depression, pt is stable, worsening mood due to school stress, seeing psyhc, no se [717.7]     Contusion of left wrist, initial encounter     Major depression, recurrent ([address]     Self-injurious behavior     SX/GLOBAL/  /NSC/RIGHT CARPAL TUNNEL RELEASE / DOS 12/13/16 / EXP 03/13/17     Left carpal tunnel syndrome     Major depr the lungs every 4 (four) hours as needed for Wheezing. Disp: 2 Inhaler Rfl: 0   Montelukast Sodium (SINGULAIR) 10 MG Oral Tab Take 1 tablet (10 mg total) by mouth daily.  Disp: 90 tablet Rfl: 3   Levothyroxine Sodium 88 MCG Oral Tab Take 1 tablet before kayce were answered and the patient understands the plan.

## 2018-12-18 ENCOUNTER — TELEPHONE (OUTPATIENT)
Dept: INTERNAL MEDICINE CLINIC | Facility: CLINIC | Age: 23
End: 2018-12-18

## 2018-12-18 NOTE — TELEPHONE ENCOUNTER
Pt gets testosterone injections daily, he is unable to give it to him self at this time due to a (r) wrist injury. Pt would like to know if he can come here so a nurse can give it to him. He is a day behind. He will bring all he needs.  Please advise

## 2018-12-18 NOTE — TELEPHONE ENCOUNTER
Patient notified that we would be able to give him the Testosterone injection, notified we would need to charge an injection fee. Pt would like to know the fee prior to scheduling NV. LL will request injection fee and let us know.   Pt notified we would c

## 2018-12-20 NOTE — TELEPHONE ENCOUNTER
LM for pt at [phone] vm to call back, wanted to give pt information - injection fee, [id number] fee is $50.00 per LL.

## 2018-12-21 NOTE — TELEPHONE ENCOUNTER
Patient states his Dad gave him his testosterone injection and needed only the one time. Pt will call if he needs anything additional.  Pt verbalizes understanding.

## 2019-01-08 RX ORDER — ALBUTEROL SULFATE 90 UG/1
2 AEROSOL, METERED RESPIRATORY (INHALATION) EVERY 4 HOURS PRN
Qty: 2 INHALER | Refills: 0 | Status: SHIPPED | OUTPATIENT
Start: 2019-01-08 | End: 2019-04-01

## 2019-01-08 NOTE — TELEPHONE ENCOUNTER
Prescription Refill Request - Patient advised can take 48-72 hours.     Name of Medication (strength, dose, qty requested:     Pt wants 2 inhalers     Albuterol Sulfate HFA (VENTOLIN HFA) 108 (90 Base) MCG/ACT Inhalation Aero Soln 2 Inhaler 0 3/6/2018     S

## 2019-01-16 ENCOUNTER — TELEPHONE (OUTPATIENT)
Dept: INTERNAL MEDICINE CLINIC | Facility: CLINIC | Age: 24
End: 2019-01-16

## 2019-01-16 NOTE — TELEPHONE ENCOUNTER
Patient dropped off a medical release form /DMV which he states AS just did for him 2 months ago. Patient states Section 2 up to #7 and the signature at the bottom has to be completed.   Patient is trying to renew his drivers license and would appreciate i

## 2019-01-22 ENCOUNTER — TELEPHONE (OUTPATIENT)
Dept: INTERNAL MEDICINE CLINIC | Facility: CLINIC | Age: 24
End: 2019-01-22

## 2019-01-22 NOTE — TELEPHONE ENCOUNTER
Pt has (R) thigh pain. Would like to know if he should give himself testosterone injections on the (R) thigh or ok to do (L) thigh?  Please advise

## 2019-01-22 NOTE — TELEPHONE ENCOUNTER
Patient states he has a lump in his right thigh, painful when he walks, has self injury in his left thigh and unsure whether he should given himself an injection there, 2 days late on his testosterone injection, his psych used to give him his testosterone

## 2019-01-23 ENCOUNTER — OFFICE VISIT (OUTPATIENT)
Dept: INTERNAL MEDICINE CLINIC | Facility: CLINIC | Age: 24
End: 2019-01-23
Payer: COMMERCIAL

## 2019-01-23 VITALS
BODY MASS INDEX: 33.15 KG/M2 | TEMPERATURE: 98 F | HEIGHT: 65 IN | SYSTOLIC BLOOD PRESSURE: 104 MMHG | HEART RATE: 92 BPM | WEIGHT: 199 LBS | DIASTOLIC BLOOD PRESSURE: 70 MMHG

## 2019-01-23 DIAGNOSIS — Z78.9 HORMONAL IMBALANCE IN TRANSGENDER PATIENT: ICD-10-CM

## 2019-01-23 DIAGNOSIS — M79.604 PAIN OF RIGHT LOWER EXTREMITY: Primary | ICD-10-CM

## 2019-01-23 DIAGNOSIS — E34.9 HORMONAL IMBALANCE IN TRANSGENDER PATIENT: ICD-10-CM

## 2019-01-23 PROCEDURE — 96372 THER/PROPH/DIAG INJ SC/IM: CPT | Performed by: NURSE PRACTITIONER

## 2019-01-23 PROCEDURE — 99213 OFFICE O/P EST LOW 20 MIN: CPT | Performed by: NURSE PRACTITIONER

## 2019-01-23 RX ORDER — TEMAZEPAM 15 MG/1
45 CAPSULE ORAL DAILY
COMMUNITY

## 2019-01-23 RX ORDER — TESTOSTERONE CYPIONATE 200 MG/ML
200 INJECTION INTRAMUSCULAR ONCE
Status: DISCONTINUED | OUTPATIENT
Start: 2019-01-23 | End: 2019-08-13

## 2019-01-23 RX ORDER — TESTOSTERONE CYPIONATE 200 MG/ML
200 INJECTION INTRAMUSCULAR ONCE
Status: COMPLETED | OUTPATIENT
Start: 2019-01-23 | End: 2019-01-23

## 2019-01-23 RX ADMIN — TESTOSTERONE CYPIONATE 200 MG: 200 INJECTION INTRAMUSCULAR at 14:00:00

## 2019-01-23 NOTE — PROGRESS NOTES
Jamar Valenzuelas is a 30year old male. Patient presents with:  Lump: LG. Room 11. Right thigh lump.  Pain when walking wants to make sure he didn't hit a nerve when giving himself the injection      HPI:   Here for eval lump on right thigh   Spoke to [facility] or medication-induced bipolar and related disorder (Steroid-induced troy)     Bipolar disorder, unspecified ([facility]     SX/GLOBAL/  /LAINEY/LEFT CARPAL TUNNEL RELEASE / DOS 05/06/16 / EXP 08/04/16     MDD (major depressive disorder), recurrent episod 600 MG Oral Tab Take 600 mg by mouth every 6 (six) hours as needed for Pain.  Disp:  Rfl:       Past Medical History:   Diagnosis Date   • ADHD    • Asthma    • Asthma    • Bipolar disorder, unspecified ([facility] 4/22/2016   • Borderline personality disorder (H constipation  MUSCULOSKELETAL:  As abo ve    EXAM:   /70 (BP Location: Left arm, Patient Position: Sitting, Cuff Size: adult)   Pulse 92   Temp 98.3 °F (36.8 °C) (Oral)   Ht 65\"   Wt 199 lb   BMI 33.12 kg/m²   GENERAL: well developed, well nourished

## 2019-02-25 ENCOUNTER — OFFICE VISIT (OUTPATIENT)
Dept: FAMILY MEDICINE CLINIC | Facility: CLINIC | Age: 24
End: 2019-02-25
Payer: COMMERCIAL

## 2019-02-25 VITALS
SYSTOLIC BLOOD PRESSURE: 110 MMHG | HEART RATE: 85 BPM | BODY MASS INDEX: 32 KG/M2 | TEMPERATURE: 98 F | OXYGEN SATURATION: 99 % | RESPIRATION RATE: 20 BRPM | DIASTOLIC BLOOD PRESSURE: 80 MMHG | WEIGHT: 191.19 LBS

## 2019-02-25 DIAGNOSIS — K52.9 GASTROENTERITIS, ACUTE: Primary | ICD-10-CM

## 2019-02-25 PROCEDURE — 99213 OFFICE O/P EST LOW 20 MIN: CPT | Performed by: FAMILY MEDICINE

## 2019-02-25 RX ORDER — TRETINOIN 0.025 %
CREAM (GRAM) TOPICAL
Refills: 11 | COMMUNITY
Start: 2019-02-14 | End: 2019-08-13

## 2019-02-25 RX ORDER — PIMECROLIMUS 10 MG/G
CREAM TOPICAL
Refills: 2 | COMMUNITY
Start: 2019-02-14 | End: 2019-08-13

## 2019-02-25 RX ORDER — BUPROPION HYDROCHLORIDE 300 MG/1
TABLET ORAL
Refills: 2 | COMMUNITY
Start: 2019-02-05

## 2019-02-25 RX ORDER — DOXYCYCLINE HYCLATE 100 MG/1
CAPSULE ORAL
Refills: 1 | COMMUNITY
Start: 2019-02-14 | End: 2019-03-03

## 2019-02-25 RX ORDER — KETOCONAZOLE 20 MG/ML
SHAMPOO TOPICAL
Refills: 3 | COMMUNITY
Start: 2019-02-14

## 2019-02-25 RX ORDER — ONDANSETRON 8 MG/1
8 TABLET, ORALLY DISINTEGRATING ORAL EVERY 8 HOURS PRN
Qty: 24 TABLET | Refills: 0 | Status: SHIPPED | OUTPATIENT
Start: 2019-02-25 | End: 2019-03-01 | Stop reason: ALTCHOICE

## 2019-02-25 RX ORDER — SODIUM FLUORIDE 6.1 MG/ML
GEL, DENTIFRICE DENTAL
Refills: 5 | COMMUNITY
Start: 2019-01-25

## 2019-02-25 NOTE — PATIENT INSTRUCTIONS
Use zofran as needed for nausea/vomiting. Increase fluids--- water, broth, jello. Once fluids are kept down regularly, you may advance diet to soft solids.   Follow FROILAN diet to reduce diarrhea:  Bananas  [place]  Applesauce  [place]  Tea    Avoid dairy, but

## 2019-02-26 NOTE — PROGRESS NOTES
CHIEF COMPLAINT:   Patient presents with:  Stomach Pain: needs note, stomach pain, nausea, vomitting, diarrhea, x today      HPI:   Roger Leon is a 30year old male who presents for complaints of vomiting last night and early this am, then watery di Rfl:    Nefazodone HCl 50 MG Oral Tab Take 100 mg by mouth 2 (two) times daily. Disp:  Rfl:    modafinil 200 MG Oral Tab Take 200 mg by mouth every morning.  Disp:  Rfl:    Testosterone cypionate 200 MG/ML Intramuscular Solution INJ 0.5 mg every 7 days Jonathan Fry Types: 1 Standard drinks or equivalent per week      Frequency: Monthly or less      Drinks per session: 1 or 2      Binge frequency: Never      Comment: Cage done 1-23-19    Drug use: No       REVIEW OF SYSTEMS:   GENERAL HEALTH: feels well otherwise.  No fluids are kept down regularly, you may advance diet to soft solids. Follow FROILAN diet to reduce diarrhea:  Bananas  [place]  Applesauce  [place]  Tea    Avoid dairy, but yogurt is ok.    Consider otc probiotic (like Align) to correct imbalance of intestinal fl

## 2019-03-01 ENCOUNTER — OFFICE VISIT (OUTPATIENT)
Dept: INTERNAL MEDICINE CLINIC | Facility: CLINIC | Age: 24
End: 2019-03-01
Payer: COMMERCIAL

## 2019-03-01 ENCOUNTER — LAB ENCOUNTER (OUTPATIENT)
Dept: LAB | Facility: HOSPITAL | Age: 24
End: 2019-03-01
Payer: COMMERCIAL

## 2019-03-01 VITALS
DIASTOLIC BLOOD PRESSURE: 72 MMHG | RESPIRATION RATE: 16 BRPM | WEIGHT: 192 LBS | BODY MASS INDEX: 31.99 KG/M2 | HEART RATE: 88 BPM | SYSTOLIC BLOOD PRESSURE: 122 MMHG | HEIGHT: 65 IN

## 2019-03-01 DIAGNOSIS — J45.20 MILD INTERMITTENT ASTHMA WITHOUT COMPLICATION: Primary | ICD-10-CM

## 2019-03-01 DIAGNOSIS — Z79.899 ENCOUNTER FOR LONG-TERM (CURRENT) USE OF OTHER MEDICATIONS: Primary | ICD-10-CM

## 2019-03-01 DIAGNOSIS — F33.2 SEVERE EPISODE OF RECURRENT MAJOR DEPRESSIVE DISORDER, WITHOUT PSYCHOTIC FEATURES (HCC): ICD-10-CM

## 2019-03-01 LAB
ALT SERPL-CCNC: 24 U/L (ref 16–61)
AST SERPL-CCNC: 31 U/L (ref 15–37)
BASOPHILS # BLD AUTO: 0.02 X10(3) UL (ref 0–0.2)
BASOPHILS NFR BLD AUTO: 0.3 %
DEPRECATED RDW RBC AUTO: 36.8 FL (ref 35.1–46.3)
EOSINOPHIL # BLD AUTO: 0.02 X10(3) UL (ref 0–0.7)
EOSINOPHIL NFR BLD AUTO: 0.3 %
ERYTHROCYTE [DISTWIDTH] IN BLOOD BY AUTOMATED COUNT: 11.9 % (ref 11–15)
HCT VFR BLD AUTO: 43.5 % (ref 39–53)
HGB BLD-MCNC: 15.1 G/DL (ref 13–17.5)
IMM GRANULOCYTES # BLD AUTO: 0.03 X10(3) UL (ref 0–1)
IMM GRANULOCYTES NFR BLD: 0.4 %
LYMPHOCYTES # BLD AUTO: 2.41 X10(3) UL (ref 1–4)
LYMPHOCYTES NFR BLD AUTO: 30.8 %
MCH RBC QN AUTO: 29.8 PG (ref 26–34)
MCHC RBC AUTO-ENTMCNC: 34.7 G/DL (ref 31–37)
MCV RBC AUTO: 85.8 FL (ref 80–100)
MONOCYTES # BLD AUTO: 0.47 X10(3) UL (ref 0.1–1)
MONOCYTES NFR BLD AUTO: 6 %
NEUTROPHILS # BLD AUTO: 4.87 X10 (3) UL (ref 1.5–7.7)
NEUTROPHILS # BLD AUTO: 4.87 X10(3) UL (ref 1.5–7.7)
NEUTROPHILS NFR BLD AUTO: 62.2 %
PLATELET # BLD AUTO: 175 10(3)UL (ref 150–450)
RBC # BLD AUTO: 5.07 X10(6)UL (ref 4.3–5.7)
WBC # BLD AUTO: 7.8 X10(3) UL (ref 4–11)

## 2019-03-01 PROCEDURE — 99213 OFFICE O/P EST LOW 20 MIN: CPT | Performed by: INTERNAL MEDICINE

## 2019-03-01 PROCEDURE — 36415 COLL VENOUS BLD VENIPUNCTURE: CPT

## 2019-03-01 PROCEDURE — 84460 ALANINE AMINO (ALT) (SGPT): CPT

## 2019-03-01 PROCEDURE — 85025 COMPLETE CBC W/AUTO DIFF WBC: CPT

## 2019-03-01 PROCEDURE — 84450 TRANSFERASE (AST) (SGOT): CPT

## 2019-03-02 NOTE — PROGRESS NOTES
Patient presents with: Follow - Up: [facility]-rm 11      HPI:  Here for f/u of asthma and depresison, stable taking meds. Notes increased use of inhaler, asthma stable. Act 20, aap reviewed agree with plan. Review of Systems   No f/c/chest pain or sob.  No cou [facility])     Self-injurious behavior     SX/GLOBAL/  /NSC/RIGHT CARPAL TUNNEL RELEASE / DOS 12/13/16 / EXP 03/13/17     Left carpal tunnel syndrome     Major depression     Infectious mononucleosis without complication     Major depressive disorder, 2 (two) times daily. Disp: 30 tablet Rfl: 0   Levothyroxine Sodium 100 MCG Oral Tab Take one tablet daily on Tuesday, Thursday and Saturday before breakfast. Disp: 45 tablet Rfl: 0   haloperidol 2 MG Oral Tab Take 2 mg by mouth nightly.  Disp:  Rfl:    Ne and dry. No rash. Psychiatric: Normal mood and affect.      A/P:    Mild intermittent asthma without complication  (primary encounter diagnosis)  Severe episode of recurrent major depressive disorder, without psychotic features ([facility])  Stable chronic issues

## 2019-03-03 ENCOUNTER — HOSPITAL ENCOUNTER (EMERGENCY)
Facility: HOSPITAL | Age: 24
Discharge: HOME | End: 2019-03-03
Attending: EMERGENCY MEDICINE
Payer: OTHER MISCELLANEOUS

## 2019-03-03 VITALS
DIASTOLIC BLOOD PRESSURE: 83 MMHG | HEART RATE: 62 BPM | SYSTOLIC BLOOD PRESSURE: 128 MMHG | WEIGHT: 199.94 LBS | TEMPERATURE: 97 F | RESPIRATION RATE: 18 BRPM | OXYGEN SATURATION: 97 % | BODY MASS INDEX: 33 KG/M2

## 2019-03-03 DIAGNOSIS — S61.012A LACERATION OF LEFT THUMB WITHOUT FOREIGN BODY WITHOUT DAMAGE TO NAIL, INITIAL ENCOUNTER: Primary | ICD-10-CM

## 2019-03-03 PROCEDURE — 99284 EMERGENCY DEPT VISIT MOD MDM: CPT

## 2019-03-03 PROCEDURE — 96372 THER/PROPH/DIAG INJ SC/IM: CPT

## 2019-03-03 RX ORDER — IBUPROFEN 600 MG/1
600 TABLET ORAL ONCE
Status: DISCONTINUED | OUTPATIENT
Start: 2019-03-03 | End: 2019-03-03

## 2019-03-03 RX ORDER — KETOROLAC TROMETHAMINE 30 MG/ML
60 INJECTION, SOLUTION INTRAMUSCULAR; INTRAVENOUS ONCE
Status: COMPLETED | OUTPATIENT
Start: 2019-03-03 | End: 2019-03-03

## 2019-03-03 RX ORDER — TETANUS AND DIPHTHERIA TOXOIDS ADSORBED 2; 2 [LF]/.5ML; [LF]/.5ML
0.5 INJECTION INTRAMUSCULAR ONCE
Status: DISCONTINUED | OUTPATIENT
Start: 2019-03-03 | End: 2019-03-03

## 2019-03-04 NOTE — ED NOTES
Patient denies active SI/HI. Spoke with patient in depth and he is very aware of when he needs help.  He does not want an assessment from [facility] he said that isn't why he is here and he is seeing his therapist and in school and constantly has throughs but know

## 2019-03-04 NOTE — ED PROVIDER NOTES
Pt presents as pleasant, calm & cooperative. Pt adamantly denies any plan or intent to kill himself. Pt states his SI is chronic waxes & wanes. Pt states he'd been more depressed today d/t his therapist blocking him.     States he feels better now

## 2019-03-04 NOTE — ED PROVIDER NOTES
Patient Seen in: [facility] Emergency Department    History   Patient presents with:  Laceration Abrasion (integumentary)    Stated Complaint: lac    HPI    17-year-old male presents with laceration to the left thumb.   The injury occurred approximat Use      Smoking status: Never Smoker      Smokeless tobacco: Never Used    Alcohol use:  Yes      Alcohol/week: 0.6 oz      Types: 1 Standard drinks or equivalent per week      Frequency: Monthly or less      Drinks per session: 1 or 2      Binge frequency Impression:  Laceration of left thumb without foreign body without damage to nail, initial encounter  (primary encounter diagnosis)    Disposition:  Discharge  3/3/2019  9:26 pm    Follow-up:  Alvina Diaz MD  [address] [facility]

## 2019-03-05 ENCOUNTER — TELEPHONE (OUTPATIENT)
Dept: INTERNAL MEDICINE CLINIC | Facility: CLINIC | Age: 24
End: 2019-03-05

## 2019-03-05 NOTE — TELEPHONE ENCOUNTER
Pt woke up this AM w/stomach pains-usually when he takes something for it it goes away-now sharp shooting pain lower R side of abdomen-afraid to drive right now-dad will go to store and get something for him when he get home-call to advise if should go to

## 2019-03-05 NOTE — TELEPHONE ENCOUNTER
Patient states he has not had a BM in a week, nauseated, no appetite and has left lower abdominal pain, sharp pains that come and go, feels like waves of cramping, passing gas. Patient notified he will need to go to [facility] for evaluation.   Pt verbalizes under

## 2019-03-07 PROBLEM — G56.22 CUBITAL TUNNEL SYNDROME ON LEFT: Status: ACTIVE | Noted: 2019-03-07

## 2019-03-20 ENCOUNTER — TELEPHONE (OUTPATIENT)
Dept: INTERNAL MEDICINE CLINIC | Facility: CLINIC | Age: 24
End: 2019-03-20

## 2019-03-20 RX ORDER — OSELTAMIVIR PHOSPHATE 75 MG/1
75 CAPSULE ORAL DAILY
Qty: 10 CAPSULE | Refills: 0 | Status: SHIPPED | OUTPATIENT
Start: 2019-03-20 | End: 2019-03-28

## 2019-03-20 NOTE — TELEPHONE ENCOUNTER
Patient is calling because he has been exposed to influenza A. Would like to know if there is anything that Dr Francesco Galvan would be able to prescribe to prevent that?       Please advise

## 2019-03-20 NOTE — TELEPHONE ENCOUNTER
Called pt exposure occurred within the last 24 hours and pt has not reported symptoms, did not get the flu shot this year.   AS ok to treat with Tamiflu

## 2019-03-28 ENCOUNTER — HOSPITAL ENCOUNTER (OUTPATIENT)
Age: 24
Discharge: HOME | End: 2019-03-28
Attending: FAMILY MEDICINE
Payer: COMMERCIAL

## 2019-03-28 VITALS
SYSTOLIC BLOOD PRESSURE: 128 MMHG | BODY MASS INDEX: 30.82 KG/M2 | DIASTOLIC BLOOD PRESSURE: 80 MMHG | TEMPERATURE: 98 F | HEART RATE: 92 BPM | RESPIRATION RATE: 20 BRPM | WEIGHT: 185 LBS | HEIGHT: 65 IN | OXYGEN SATURATION: 100 %

## 2019-03-28 DIAGNOSIS — J11.1 FLU: Primary | ICD-10-CM

## 2019-03-28 PROCEDURE — 99213 OFFICE O/P EST LOW 20 MIN: CPT

## 2019-03-28 PROCEDURE — 99204 OFFICE O/P NEW MOD 45 MIN: CPT

## 2019-03-28 RX ORDER — FLUTICASONE PROPIONATE 50 MCG
2 SPRAY, SUSPENSION (ML) NASAL DAILY
Qty: 1 BOTTLE | Refills: 1 | Status: SHIPPED | OUTPATIENT
Start: 2019-03-28 | End: 2019-07-08 | Stop reason: ALTCHOICE

## 2019-03-28 RX ORDER — BENZONATATE 200 MG/1
200 CAPSULE ORAL EVERY 8 HOURS PRN
Qty: 30 CAPSULE | Refills: 0 | Status: SHIPPED | OUTPATIENT
Start: 2019-03-28 | End: 2019-04-01

## 2019-03-28 RX ORDER — OSELTAMIVIR PHOSPHATE 75 MG/1
75 CAPSULE ORAL 2 TIMES DAILY
Qty: 7 CAPSULE | Refills: 0 | Status: SHIPPED | OUTPATIENT
Start: 2019-03-28 | End: 2019-07-08 | Stop reason: ALTCHOICE

## 2019-03-28 NOTE — ED INITIAL ASSESSMENT (HPI)
INR is 1.5 with goal of 2.0 to 3.0. Per protocol, warfarin dose increased about 7% with INR recheck in 2 weeks. On-site Provider: Dr Haro. Message left to inform patient.   Onset Tuesday of fever, body aches and cough.

## 2019-03-28 NOTE — ED PROVIDER NOTES
Patient Seen in: [facility]    History   Patient presents with:  Flu    Stated Complaint: cough and body aches , fever x2 days     Was exposed to flu from Tuesday to Thursday last week.  Started once daily tamiflu on Thursda Yes      Alcohol/week: 0.6 oz      Types: 1 Standard drinks or equivalent per week      Frequency: Monthly or less      Drinks per session: 1 or 2      Binge frequency: Never      Comment: cage 3/1/19    Drug use: No      Review of Systems   All other syst [address]  [phone]    In 3 days          Medications Prescribed:  Discharge Medication List as of 3/28/2019  4:48 PM    START taking these medications    Oseltamivir Phosphate 75 MG Oral Cap  Take 1 capsule (75 mg total) by mouth 2 (two) times daily. , Nor

## 2019-03-28 NOTE — TELEPHONE ENCOUNTER
Patient notified to go to [facility] for evaluation. Pt verbalizes understanding.

## 2019-03-28 NOTE — TELEPHONE ENCOUNTER
Patient is currently taking Tamiflu once daily for 10 days(has 3 doses left), now has ST, productive cough but sometimes dry, clear phlegm, h/a, chills, wheezing off and on, chest and head congestion, has taken so much time off of work because of his depre

## 2019-03-28 NOTE — TELEPHONE ENCOUNTER
Pt started the medication on Thursday 03/21/19 but now he is c/o sore throat, chest congestion, possible fever and persistent cough.     Pt would like a call back to discuss what else he can do     Please advise

## 2019-04-01 ENCOUNTER — LAB ENCOUNTER (OUTPATIENT)
Dept: LAB | Facility: HOSPITAL | Age: 24
End: 2019-04-01
Attending: PHYSICIAN ASSISTANT
Payer: COMMERCIAL

## 2019-04-01 ENCOUNTER — OFFICE VISIT (OUTPATIENT)
Dept: INTERNAL MEDICINE CLINIC | Facility: CLINIC | Age: 24
End: 2019-04-01
Payer: COMMERCIAL

## 2019-04-01 VITALS
OXYGEN SATURATION: 99 % | BODY MASS INDEX: 31.43 KG/M2 | RESPIRATION RATE: 16 BRPM | HEIGHT: 65 IN | DIASTOLIC BLOOD PRESSURE: 76 MMHG | SYSTOLIC BLOOD PRESSURE: 124 MMHG | HEART RATE: 84 BPM | WEIGHT: 188.63 LBS | TEMPERATURE: 98 F

## 2019-04-01 DIAGNOSIS — R05.9 COUGH: Primary | ICD-10-CM

## 2019-04-01 DIAGNOSIS — J11.1 INFLUENZA: ICD-10-CM

## 2019-04-01 DIAGNOSIS — L72.0 EPITHELIAL INCLUSION CYST: ICD-10-CM

## 2019-04-01 DIAGNOSIS — J45.21 MILD INTERMITTENT ASTHMA WITH EXACERBATION: ICD-10-CM

## 2019-04-01 DIAGNOSIS — B35.1 TINEA UNGUIUM: Primary | ICD-10-CM

## 2019-04-01 DIAGNOSIS — Z79.899 ENCOUNTER FOR LONG-TERM (CURRENT) USE OF OTHER MEDICATIONS: ICD-10-CM

## 2019-04-01 PROCEDURE — 99213 OFFICE O/P EST LOW 20 MIN: CPT | Performed by: NURSE PRACTITIONER

## 2019-04-01 PROCEDURE — 36415 COLL VENOUS BLD VENIPUNCTURE: CPT

## 2019-04-01 PROCEDURE — 80076 HEPATIC FUNCTION PANEL: CPT

## 2019-04-01 RX ORDER — FLUTICASONE PROPIONATE AND SALMETEROL 250; 50 UG/1; UG/1
1 POWDER RESPIRATORY (INHALATION) EVERY 12 HOURS SCHEDULED
Qty: 1 PACKAGE | Refills: 0 | Status: SHIPPED | OUTPATIENT
Start: 2019-04-01 | End: 2019-05-01

## 2019-04-01 RX ORDER — BENZONATATE 200 MG/1
200 CAPSULE ORAL EVERY 8 HOURS PRN
Qty: 30 CAPSULE | Refills: 0 | Status: SHIPPED | OUTPATIENT
Start: 2019-04-01 | End: 2019-05-01

## 2019-04-01 RX ORDER — DEXMETHYLPHENIDATE HYDROCHLORIDE 10 MG/1
TABLET ORAL
Refills: 0 | COMMUNITY
Start: 2019-03-25 | End: 2019-04-09

## 2019-04-01 RX ORDER — TERBINAFINE HYDROCHLORIDE 250 MG/1
TABLET ORAL
Refills: 0 | COMMUNITY
Start: 2019-03-07 | End: 2019-07-08 | Stop reason: ALTCHOICE

## 2019-04-01 RX ORDER — NEEDLES, FILTER 19GX1 1/2"
NEEDLE, DISPOSABLE MISCELLANEOUS
Refills: 3 | COMMUNITY
Start: 2019-03-11

## 2019-04-01 RX ORDER — ALBUTEROL SULFATE 2.5 MG/3ML
2.5 SOLUTION RESPIRATORY (INHALATION) EVERY 6 HOURS PRN
Qty: 100 VIAL | Refills: 1 | Status: SHIPPED | OUTPATIENT
Start: 2019-04-01 | End: 2021-06-09

## 2019-04-01 NOTE — PROGRESS NOTES
Jose Davison is a 30year old male.   Patient presents with:  Cough: cn room 10: patient is here for a cough today , patient previously had the flu and cough has gotten worse       HPI:   Here for eval.   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 MG Disp: 2 Inhaler Rfl: 0   Naltrexone HCl 50 MG Oral Tab Take 50 mg by mouth 2 (two) times daily. Disp:  Rfl:    ALPRAZolam 1 MG Oral Tab Take 1 mg by mouth 2 (two) times daily.    Disp: 30 tablet Rfl: 0   Levothyroxine Sodium 100 MCG Oral Tab Take one tablet Mood disorder ([facility]    • Obesity, unspecified    • Unspecified hypothyroidism    • Varicella without mention of complication 1345      Social History:  Social History    Tobacco Use      Smoking status: Never Smoker      Smokeless tobacco: Never Used    Al 3    ASSESSMENT AND PLAN:     Cough  (primary encounter diagnosis) avoid prednisone  Try adviar bid with continued tessalon perles and ventolin   See me or AS next week for eval.   Call with worsening symptoms.    Influenza  Mild intermittent asthma with ex

## 2019-04-02 RX ORDER — MONTELUKAST SODIUM 10 MG/1
TABLET ORAL
Qty: 90 TABLET | Refills: 1 | Status: SHIPPED | OUTPATIENT
Start: 2019-04-02 | End: 2019-04-04

## 2019-04-03 ENCOUNTER — TELEPHONE (OUTPATIENT)
Dept: INTERNAL MEDICINE CLINIC | Facility: CLINIC | Age: 24
End: 2019-04-03

## 2019-04-03 NOTE — TELEPHONE ENCOUNTER
Pt called and stated that [facility] will not be able to get him his singulair for 10 days and is asking for an emergent 10 day supply sent to his local [facility] [phone] - [address] [facility], [address] [facility], [phone]    MON

## 2019-04-04 RX ORDER — MONTELUKAST SODIUM 10 MG/1
10 TABLET ORAL
Qty: 30 TABLET | Refills: 0 | Status: SHIPPED | OUTPATIENT
Start: 2019-04-04 | End: 2019-05-01

## 2019-04-09 ENCOUNTER — OFFICE VISIT (OUTPATIENT)
Dept: INTERNAL MEDICINE CLINIC | Facility: CLINIC | Age: 24
End: 2019-04-09
Payer: COMMERCIAL

## 2019-04-09 VITALS
WEIGHT: 182.19 LBS | BODY MASS INDEX: 30.35 KG/M2 | HEART RATE: 84 BPM | SYSTOLIC BLOOD PRESSURE: 120 MMHG | OXYGEN SATURATION: 99 % | RESPIRATION RATE: 16 BRPM | TEMPERATURE: 98 F | HEIGHT: 65 IN | DIASTOLIC BLOOD PRESSURE: 82 MMHG

## 2019-04-09 DIAGNOSIS — J06.9 ACUTE URI: ICD-10-CM

## 2019-04-09 DIAGNOSIS — J45.21 MILD INTERMITTENT ASTHMA WITH EXACERBATION: Primary | ICD-10-CM

## 2019-04-09 DIAGNOSIS — R05.9 COUGH: ICD-10-CM

## 2019-04-09 PROCEDURE — 99213 OFFICE O/P EST LOW 20 MIN: CPT | Performed by: NURSE PRACTITIONER

## 2019-04-09 RX ORDER — DEXMETHYLPHENIDATE HYDROCHLORIDE 15 MG/1
15 CAPSULE, EXTENDED RELEASE ORAL 2 TIMES DAILY
COMMUNITY
End: 2020-06-12

## 2019-04-09 NOTE — PROGRESS NOTES
Jesus Khanna is a 30year old male. Patient presents with: Follow - Up: cn room 10: 1 week follow up on cough       HPI:   Here for 1 week follow up    Cough, asthma exacerbation and acute URI. Cough is better   Not as intense or frequent.    Using /LOM/LEFT CARPAL TUNNEL RELEASE / DOS 05/06/16 / EXP 08/04/16     MDD (major depressive disorder), recurrent episode, severe (HCC)     MDD (major depressive disorder)     Suicide attempt ([facility]     TFC (triangular fibrocartilage complex) injury, right, init AS TOLERATED TO AFFECTED AREA(S) OF CHEST Disp:  Rfl: 11   PREVIDENT 5000 DRY MOUTH 1.1 % Dental Gel USE INSTEAD OF REGULAR TOOTHPASTE TO BRUSH TEETH ONCE DAILY Disp:  Rfl: 5   Ketoconazole 2 % External Shampoo use to wash AFFECTED AREAS OF SCALP, BEHIND E disorder ([facility]    • Obesity, unspecified    • Unspecified hypothyroidism    • Varicella without mention of complication 8422      Social History:  Social History    Tobacco Use      Smoking status: Never Smoker      Smokeless tobacco: Never Used    Alcohol wants to try to start running. Cont symbicort for a few more weeks. Finish tessalon. Use proair PRN and especially before running. Avoid long distance. Acute uri  Cough    No orders of the defined types were placed in this encounter.       Meds &

## 2019-05-01 RX ORDER — BENZONATATE 200 MG/1
CAPSULE ORAL
Qty: 30 CAPSULE | Refills: 0 | Status: SHIPPED | OUTPATIENT
Start: 2019-05-01 | End: 2019-07-08 | Stop reason: ALTCHOICE

## 2019-05-01 RX ORDER — FLUTICASONE PROPIONATE AND SALMETEROL 250; 50 UG/1; UG/1
POWDER RESPIRATORY (INHALATION)
Qty: 60 EACH | Refills: 2 | Status: SHIPPED | OUTPATIENT
Start: 2019-05-01 | End: 2019-07-08

## 2019-05-01 RX ORDER — MONTELUKAST SODIUM 10 MG/1
TABLET ORAL
Qty: 90 TABLET | Refills: 1 | Status: SHIPPED | OUTPATIENT
Start: 2019-05-01 | End: 2019-09-06

## 2019-05-01 NOTE — TELEPHONE ENCOUNTER
LOV: 4/9/19 w/ SD for Asthma  FOV: 7/8/19  Last labs: 4/1/19 Hepatic Function Panel  Last Refill: 4/1/19 qt:30    Per protocol routed to provider

## 2019-07-08 ENCOUNTER — OFFICE VISIT (OUTPATIENT)
Dept: INTERNAL MEDICINE CLINIC | Facility: CLINIC | Age: 24
End: 2019-07-08
Payer: COMMERCIAL

## 2019-07-08 ENCOUNTER — LAB ENCOUNTER (OUTPATIENT)
Dept: LAB | Age: 24
End: 2019-07-08
Attending: INTERNAL MEDICINE
Payer: COMMERCIAL

## 2019-07-08 ENCOUNTER — TELEPHONE (OUTPATIENT)
Dept: INTERNAL MEDICINE CLINIC | Facility: CLINIC | Age: 24
End: 2019-07-08

## 2019-07-08 VITALS
WEIGHT: 170 LBS | TEMPERATURE: 98 F | HEIGHT: 65 IN | DIASTOLIC BLOOD PRESSURE: 74 MMHG | BODY MASS INDEX: 28.32 KG/M2 | SYSTOLIC BLOOD PRESSURE: 110 MMHG | HEART RATE: 76 BPM

## 2019-07-08 DIAGNOSIS — E03.8 HYPOTHYROIDISM, SECONDARY: Primary | ICD-10-CM

## 2019-07-08 DIAGNOSIS — F64.9 GENDER IDENTITY DISORDER: ICD-10-CM

## 2019-07-08 DIAGNOSIS — F31.70 BIPOLAR AFFECTIVE DISORDER IN REMISSION (HCC): ICD-10-CM

## 2019-07-08 DIAGNOSIS — J45.20 MILD INTERMITTENT ASTHMA WITHOUT COMPLICATION: ICD-10-CM

## 2019-07-08 DIAGNOSIS — Z01.818 PRE-OP EXAMINATION: ICD-10-CM

## 2019-07-08 DIAGNOSIS — E03.8 HYPOTHYROIDISM, SECONDARY: ICD-10-CM

## 2019-07-08 LAB
ALBUMIN SERPL-MCNC: 4 G/DL (ref 3.4–5)
ALBUMIN/GLOB SERPL: 1.4 {RATIO} (ref 1–2)
ALP LIVER SERPL-CCNC: 69 U/L (ref 45–117)
ALT SERPL-CCNC: 21 U/L (ref 16–61)
ANION GAP SERPL CALC-SCNC: 5 MMOL/L (ref 0–18)
AST SERPL-CCNC: 20 U/L (ref 15–37)
BASOPHILS # BLD AUTO: 0.01 X10(3) UL (ref 0–0.2)
BASOPHILS NFR BLD AUTO: 0.2 %
BILIRUB SERPL-MCNC: 0.6 MG/DL (ref 0.1–2)
BUN BLD-MCNC: 14 MG/DL (ref 7–18)
BUN/CREAT SERPL: 13.6 (ref 10–20)
CALCIUM BLD-MCNC: 9.6 MG/DL (ref 8.5–10.1)
CHLORIDE SERPL-SCNC: 103 MMOL/L (ref 98–112)
CO2 SERPL-SCNC: 31 MMOL/L (ref 21–32)
CREAT BLD-MCNC: 1.03 MG/DL (ref 0.7–1.3)
DEPRECATED RDW RBC AUTO: 39.6 FL (ref 35.1–46.3)
EOSINOPHIL # BLD AUTO: 0.03 X10(3) UL (ref 0–0.7)
EOSINOPHIL NFR BLD AUTO: 0.5 %
ERYTHROCYTE [DISTWIDTH] IN BLOOD BY AUTOMATED COUNT: 11.9 % (ref 11–15)
ESTRADIOL SERPL-MCNC: 26.9 PG/ML (ref 11–52.5)
GLOBULIN PLAS-MCNC: 2.9 G/DL (ref 2.8–4.4)
GLUCOSE BLD-MCNC: 71 MG/DL (ref 70–99)
HCT VFR BLD AUTO: 46.6 % (ref 39–53)
HGB BLD-MCNC: 15.5 G/DL (ref 13–17.5)
IMM GRANULOCYTES # BLD AUTO: 0.01 X10(3) UL (ref 0–1)
IMM GRANULOCYTES NFR BLD: 0.2 %
LYMPHOCYTES # BLD AUTO: 2.03 X10(3) UL (ref 1–4)
LYMPHOCYTES NFR BLD AUTO: 30.9 %
M PROTEIN MFR SERPL ELPH: 6.9 G/DL (ref 6.4–8.2)
MCH RBC QN AUTO: 30.1 PG (ref 26–34)
MCHC RBC AUTO-ENTMCNC: 33.3 G/DL (ref 31–37)
MCV RBC AUTO: 90.5 FL (ref 80–100)
MONOCYTES # BLD AUTO: 0.5 X10(3) UL (ref 0.1–1)
MONOCYTES NFR BLD AUTO: 7.6 %
NEUTROPHILS # BLD AUTO: 4 X10 (3) UL (ref 1.5–7.7)
NEUTROPHILS # BLD AUTO: 4 X10(3) UL (ref 1.5–7.7)
NEUTROPHILS NFR BLD AUTO: 60.6 %
OSMOLALITY SERPL CALC.SUM OF ELEC: 287 MOSM/KG (ref 275–295)
PLATELET # BLD AUTO: 201 10(3)UL (ref 150–450)
POTASSIUM SERPL-SCNC: 3.9 MMOL/L (ref 3.5–5.1)
PROLACTIN SERPL-MCNC: 32.6 NG/ML (ref 2.5–17.4)
RBC # BLD AUTO: 5.15 X10(6)UL (ref 4.3–5.7)
SODIUM SERPL-SCNC: 139 MMOL/L (ref 136–145)
T4 FREE SERPL-MCNC: 0.9 NG/DL (ref 0.8–1.7)
TSI SER-ACNC: 3.62 MIU/ML (ref 0.36–3.74)
WBC # BLD AUTO: 6.6 X10(3) UL (ref 4–11)

## 2019-07-08 PROCEDURE — 85025 COMPLETE CBC W/AUTO DIFF WBC: CPT

## 2019-07-08 PROCEDURE — 84146 ASSAY OF PROLACTIN: CPT

## 2019-07-08 PROCEDURE — 84443 ASSAY THYROID STIM HORMONE: CPT

## 2019-07-08 PROCEDURE — 99214 OFFICE O/P EST MOD 30 MIN: CPT | Performed by: INTERNAL MEDICINE

## 2019-07-08 PROCEDURE — 80053 COMPREHEN METABOLIC PANEL: CPT

## 2019-07-08 PROCEDURE — 36415 COLL VENOUS BLD VENIPUNCTURE: CPT

## 2019-07-08 PROCEDURE — 84402 ASSAY OF FREE TESTOSTERONE: CPT

## 2019-07-08 PROCEDURE — 84403 ASSAY OF TOTAL TESTOSTERONE: CPT

## 2019-07-08 PROCEDURE — 82670 ASSAY OF TOTAL ESTRADIOL: CPT

## 2019-07-08 PROCEDURE — 84439 ASSAY OF FREE THYROXINE: CPT

## 2019-07-08 RX ORDER — HALOPERIDOL 5 MG
2.5 TABLET ORAL 2 TIMES DAILY
COMMUNITY
End: 2021-09-29

## 2019-07-08 NOTE — TELEPHONE ENCOUNTER
Papito Loja at [facility] will change Testosterone order to Testosterone Total and Free male as always has been ordered by AS and pt's Endocrinologist.  AS aware.

## 2019-07-08 NOTE — PROGRESS NOTES
Patient presents with:  Pre-Op Exam: LG. Room 8. Having bilateral mastectomy on 7-31-19 with       HPI:  Here for pre op for tabitha mastectomy as part of gender reassignment surgery.  He has hypothyroidism which is stable on meds, on psych meds, stable History of self injurious behavior     Acne     Chondromalacia, patella, right     Obesity     Bipolar disorder (HCC)     Chondromalacia of patella, right [717.7]     Contusion of left wrist, initial encounter     Major depression, recurrent ([address]     Self Imelda Guerra MD at [address] [place] [address]   • ARTHROSCOPY WRIST Right 12/14/2018    Performed by Rene Atwood MD at [address] [place]  Left 5/6/2016    Performed by Rene Atwood MD at [address] [place] [address] TABLET BY MOUTH ONE TIME A DAY  Disp: 90 tablet Rfl: 1   Ciclopirox 8 % External Solution APPLY TO AFFECTED NAILS ONCE DAILY Disp:  Rfl: 3   Econazole Nitrate 1 % External Cream APPLY to affected area on feet TWO TIMES A DAY Disp:  Rfl: 3   Dexmethylphenid Wednesday, and Friday Disp: 64 tablet Rfl: 0   finasteride 5 MG Oral Tab Take 5 mg by mouth daily. Disp:  Rfl:    ibuprofen (MOTRIN) 600 MG Oral Tab Take 600 mg by mouth every 6 (six) hours as needed for Pain.  Disp:  Rfl:        Allergies    Lamictal normal. No respiratory distress. No wheezes, rhonchi or rales  Abdominal: Soft. Bowel sounds are normal. Non tender, no masses, no organomegaly or hernias. Musculoskeletal: Normal range of motion. No edema and no tenderness. No effusions.   Lymphadenopath

## 2019-07-08 NOTE — TELEPHONE ENCOUNTER
The lab called about test for testosterone, free and total-states is for female/transgender patient-not sure if right test was ordered-pt is male-call to change order if needed

## 2019-07-13 LAB
TESTOSTERONE TOTAL: 581.4 NG/DL
TESTOSTERONE, FREE -MS/MS: 132.9 PG/ML

## 2019-07-16 ENCOUNTER — LAB ENCOUNTER (OUTPATIENT)
Dept: LAB | Facility: HOSPITAL | Age: 24
End: 2019-07-16
Attending: INTERNAL MEDICINE
Payer: COMMERCIAL

## 2019-07-16 ENCOUNTER — TELEPHONE (OUTPATIENT)
Dept: INTERNAL MEDICINE CLINIC | Facility: CLINIC | Age: 24
End: 2019-07-16

## 2019-07-16 DIAGNOSIS — Z01.818 PREOPERATIVE CLEARANCE: ICD-10-CM

## 2019-07-16 DIAGNOSIS — Z01.818 PREOPERATIVE CLEARANCE: Primary | ICD-10-CM

## 2019-07-16 LAB
APTT PPP: 30.3 SECONDS (ref 25.4–36.1)
INR BLD: 1.06 (ref 0.9–1.1)
PSA SERPL DL<=0.01 NG/ML-MCNC: 14.3 SECONDS (ref 12.5–14.7)

## 2019-07-16 PROCEDURE — 36415 COLL VENOUS BLD VENIPUNCTURE: CPT

## 2019-07-16 PROCEDURE — 85730 THROMBOPLASTIN TIME PARTIAL: CPT

## 2019-07-16 PROCEDURE — 85610 PROTHROMBIN TIME: CPT

## 2019-07-16 NOTE — TELEPHONE ENCOUNTER
Per AS, ok to order additional preoperative labs PT/INR, PTT and fax results to f [phone] to Dr Meggan Mendoza.

## 2019-07-16 NOTE — TELEPHONE ENCOUNTER
Patient given copies of lab orders and directed to [employer] for PT/INR/PTT. Sent request for labs from Dr Juan Carlos Pop to scan. Pt verbalizes understanding.

## 2019-07-26 ENCOUNTER — TELEPHONE (OUTPATIENT)
Dept: INTERNAL MEDICINE CLINIC | Facility: CLINIC | Age: 24
End: 2019-07-26

## 2019-07-31 ENCOUNTER — HOSPITAL (OUTPATIENT)
Dept: OTHER | Age: 24
End: 2019-07-31

## 2019-08-06 LAB — PATHOLOGIST NAME: NORMAL

## 2019-08-13 ENCOUNTER — OFFICE VISIT (OUTPATIENT)
Dept: INTERNAL MEDICINE CLINIC | Facility: CLINIC | Age: 24
End: 2019-08-13
Payer: COMMERCIAL

## 2019-08-13 VITALS
BODY MASS INDEX: 27.16 KG/M2 | DIASTOLIC BLOOD PRESSURE: 84 MMHG | HEART RATE: 84 BPM | HEIGHT: 65 IN | WEIGHT: 163 LBS | SYSTOLIC BLOOD PRESSURE: 118 MMHG | RESPIRATION RATE: 16 BRPM

## 2019-08-13 DIAGNOSIS — K59.03 DRUG-INDUCED CONSTIPATION: Primary | ICD-10-CM

## 2019-08-13 PROCEDURE — 99213 OFFICE O/P EST LOW 20 MIN: CPT | Performed by: INTERNAL MEDICINE

## 2019-08-13 NOTE — PROGRESS NOTES
Patient presents with:  Irritable Bowel: Pt states he is having irritable bowel movements on and off for the past 5 months.  LB-rm 8      HPI:  Here for f/u of constipation, worse post up from chest surgery, no bm for 8 days, took dulcolax two days, relieve disorder ([facility]     Chondromalacia of patella, right [717.7]     Contusion of left wrist, initial encounter     Major depression, recurrent ([facility]     Self-injurious behavior     SX/GLOBAL/  /NSC/RIGHT CARPAL TUNNEL RELEASE / DOS 12/13/16 / EXP 03/ WHEEZING Disp: 18 g Rfl: 2   PREVIDENT 5000 DRY MOUTH 1.1 % Dental Gel USE INSTEAD OF REGULAR TOOTHPASTE TO BRUSH TEETH ONCE DAILY Disp:  Rfl: 5   Ketoconazole 2 % External Shampoo use to wash AFFECTED AREAS OF SCALP, BEHIND EARS, AND EYEBROWS DAILY Disp: orders of the defined types were placed in this encounter. Meds & Refills for this Visit:  Requested Prescriptions      No prescriptions requested or ordered in this encounter       Imaging & Consults:  None    No follow-ups on file.   There are no Rebecca Oliva

## 2019-09-07 RX ORDER — MONTELUKAST SODIUM 10 MG/1
TABLET ORAL
Qty: 90 TABLET | Refills: 0 | Status: SHIPPED | OUTPATIENT
Start: 2019-09-07 | End: 2019-11-04

## 2019-09-07 NOTE — TELEPHONE ENCOUNTER
Last Ov: 8/13/19, AS, acute  Upcoming appt: 10/29/19  Last labs: PT, PTT 7/16/19  Last Rx: montelukast 10mg, #90, 1R 5/1/19    Per Protocol, passed. Refill sent.

## 2019-09-11 ENCOUNTER — HOSPITAL (OUTPATIENT)
Dept: OTHER | Age: 24
End: 2019-09-11
Attending: PLASTIC SURGERY

## 2019-09-16 ENCOUNTER — TELEPHONE (OUTPATIENT)
Dept: INTERNAL MEDICINE CLINIC | Facility: CLINIC | Age: 24
End: 2019-09-16

## 2019-09-16 NOTE — TELEPHONE ENCOUNTER
Pt has old surgical site oozing. Pt has surgery 7-31-19, had a double mastectomy. Pt called the on call doctor over the weekend. Was told to go to urgent care or FU with surgeon. Surgeon is in the city, does not have the time to go see her.  Was offer
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
## 2019-09-20 ENCOUNTER — TELEPHONE (OUTPATIENT)
Dept: INTERNAL MEDICINE CLINIC | Facility: CLINIC | Age: 24
End: 2019-09-20

## 2019-09-20 ENCOUNTER — OFFICE VISIT (OUTPATIENT)
Dept: INTERNAL MEDICINE CLINIC | Facility: CLINIC | Age: 24
End: 2019-09-20
Payer: COMMERCIAL

## 2019-09-20 VITALS
WEIGHT: 165 LBS | BODY MASS INDEX: 27 KG/M2 | HEART RATE: 91 BPM | SYSTOLIC BLOOD PRESSURE: 120 MMHG | OXYGEN SATURATION: 97 % | RESPIRATION RATE: 16 BRPM | TEMPERATURE: 98 F | DIASTOLIC BLOOD PRESSURE: 80 MMHG

## 2019-09-20 DIAGNOSIS — T81.49XA INFECTED INCISION: Primary | ICD-10-CM

## 2019-09-20 PROCEDURE — 99213 OFFICE O/P EST LOW 20 MIN: CPT | Performed by: INTERNAL MEDICINE

## 2019-09-20 RX ORDER — AMOXICILLIN AND CLAVULANATE POTASSIUM 875; 125 MG/1; MG/1
1 TABLET, FILM COATED ORAL 2 TIMES DAILY
Qty: 20 TABLET | Refills: 0 | Status: SHIPPED | OUTPATIENT
Start: 2019-09-20 | End: 2019-09-30

## 2019-09-20 NOTE — TELEPHONE ENCOUNTER
Pt saw AS today and he wanted pt to see plastic surgeon-pt called stating he called plastic surgeon to try and get in and they told him they reviewed his file and that they cannot help him and he should see his surgeon-his surgeon is on medical leave and h

## 2019-09-20 NOTE — PROGRESS NOTES
Patient presents with: Follow - Up: oozing from surgery site, noticed last friday      HPI:  Here for oozing from post op insicion from his breast removal. Called surery they could not get him in at imes he could make it. Has some oozing, deneis pain.  Not disorder ([facility]     Chondromalacia of patella, right [717.7]     Contusion of left wrist, initial encounter     Major depression, recurrent ([facility]     Self-injurious behavior     SX/GLOBAL/  /NSC/RIGHT CARPAL TUNNEL RELEASE / DOS 12/13/16 / EXP 03/ Rfl: 1   VENTOLIN  (90 Base) MCG/ACT Inhalation Aero Soln INHALE 2 PUFFS BY MOUTH EVERY FOUR HOURS AS NEEDED FOR WHEEZING Disp: 18 g Rfl: 2   PREVIDENT 5000 DRY MOUTH 1.1 % Dental Gel USE INSTEAD OF REGULAR TOOTHPASTE TO BRUSH TEETH ONCE DAILY Disp: for this Visit:  Requested Prescriptions     Signed Prescriptions Disp Refills   • Amoxicillin-Pot Clavulanate 875-125 MG Oral Tab 20 tablet 0     Sig: Take 1 tablet by mouth 2 (two) times daily for 10 days.        Imaging & Consults:  PLASTIC SURGERY - INT

## 2019-09-23 NOTE — TELEPHONE ENCOUNTER
Patient states he left a message at Dr Opal Ansari office, MD on call for pt's plastic surgeon. I spoke with Keli Chau at their office([phone]) who indicates they would give pt a call to schedule.  I encourage the pt to be flexible since MD is only in the off

## 2019-09-27 ENCOUNTER — OFFICE VISIT (OUTPATIENT)
Dept: INTERNAL MEDICINE CLINIC | Facility: CLINIC | Age: 24
End: 2019-09-27
Payer: COMMERCIAL

## 2019-09-27 VITALS
HEART RATE: 99 BPM | TEMPERATURE: 98 F | RESPIRATION RATE: 16 BRPM | SYSTOLIC BLOOD PRESSURE: 114 MMHG | WEIGHT: 163.81 LBS | HEIGHT: 65 IN | DIASTOLIC BLOOD PRESSURE: 62 MMHG | OXYGEN SATURATION: 98 % | BODY MASS INDEX: 27.29 KG/M2

## 2019-09-27 DIAGNOSIS — T81.41XA SUPERFICIAL INCISIONAL INFECTION OF SURGICAL SITE: Primary | ICD-10-CM

## 2019-09-27 PROCEDURE — 99213 OFFICE O/P EST LOW 20 MIN: CPT | Performed by: INTERNAL MEDICINE

## 2019-09-27 NOTE — PROGRESS NOTES
Patient presents with: Infection: RG rm 9 one week f/u chest infection      HPI:  Here for f/u of superficla wound/infeciton of healing right chest incision, s/p abx, resolved, small wound closed and no drainage no pain or redness. Seen by his surgeon. wrist, initial encounter     Major depression, recurrent ([facility]     Self-injurious behavior     SX/GLOBAL/  /NSC/RIGHT CARPAL TUNNEL RELEASE / DOS 12/13/16 / EXP 03/13/17     Left carpal tunnel syndrome     Major depression     Infectious mononucl BY MOUTH EVERY FOUR HOURS AS NEEDED FOR WHEEZING Disp: 18 g Rfl: 2   PREVIDENT 5000 DRY MOUTH 1.1 % Dental Gel USE INSTEAD OF REGULAR TOOTHPASTE TO BRUSH TEETH ONCE DAILY Disp:  Rfl: 5   Ketoconazole 2 % External Shampoo use to wash AFFECTED AREAS OF SCALP Consults:  None    Return in about 3 months (around 12/27/2019). There are no Patient Instructions on file for this visit. All questions were answered and the patient understands the plan.

## 2019-11-02 NOTE — TELEPHONE ENCOUNTER
LOV:9/27/19 AS  FOV:11/18/19  LAST RX:9/7/19 10 mg take 1 tab daily 90 tabs 0 refills   LAST LABS:7/16/19 pt,ptt  PER PROTOCOL: to provider

## 2019-11-04 RX ORDER — MONTELUKAST SODIUM 10 MG/1
TABLET ORAL
Qty: 90 TABLET | Refills: 0 | Status: SHIPPED | OUTPATIENT
Start: 2019-11-04 | End: 2020-01-27

## 2019-11-18 ENCOUNTER — OFFICE VISIT (OUTPATIENT)
Dept: INTERNAL MEDICINE CLINIC | Facility: CLINIC | Age: 24
End: 2019-11-18
Payer: COMMERCIAL

## 2019-11-18 VITALS
HEIGHT: 65 IN | HEART RATE: 92 BPM | OXYGEN SATURATION: 98 % | SYSTOLIC BLOOD PRESSURE: 132 MMHG | TEMPERATURE: 98 F | RESPIRATION RATE: 16 BRPM | DIASTOLIC BLOOD PRESSURE: 88 MMHG | BODY MASS INDEX: 27.49 KG/M2 | WEIGHT: 165 LBS

## 2019-11-18 DIAGNOSIS — F33.0 MILD EPISODE OF RECURRENT MAJOR DEPRESSIVE DISORDER (HCC): ICD-10-CM

## 2019-11-18 DIAGNOSIS — J45.20 MILD INTERMITTENT ASTHMA WITHOUT COMPLICATION: Primary | ICD-10-CM

## 2019-11-18 PROCEDURE — 90686 IIV4 VACC NO PRSV 0.5 ML IM: CPT | Performed by: INTERNAL MEDICINE

## 2019-11-18 PROCEDURE — 90471 IMMUNIZATION ADMIN: CPT | Performed by: INTERNAL MEDICINE

## 2019-11-18 PROCEDURE — 99213 OFFICE O/P EST LOW 20 MIN: CPT | Performed by: INTERNAL MEDICINE

## 2019-11-18 NOTE — PROGRESS NOTES
Patient presents with: Follow - Up: RG rm 8 Chest infection and surgery f/u      HPI:  Here for f/u depression and asthma, stable doing well taking meds no se's. Pt has scarring of chest from his double mastecomy surgery, seeing his surgeon in 12/19. wrist, initial encounter     Major depression, recurrent ([facility]     Self-injurious behavior     SX/GLOBAL/  /NSC/RIGHT CARPAL TUNNEL RELEASE / DOS 12/13/16 / EXP 03/13/17     Left carpal tunnel syndrome     Major depression     Infectious mononucl DAILY  5   • Ketoconazole 2 % External Shampoo use to wash AFFECTED AREAS OF SCALP, BEHIND EARS, AND EYEBROWS DAILY  3   • buPROPion HCl ER, XL, 300 MG Oral Tablet 24 Hr TAKE 1 TABLET BY MOUTH IN THE MORNING  2   • temazepam 15 MG Oral Cap Take 45 mg by mo [19649]      Meds & Refills for this Visit:  Requested Prescriptions      No prescriptions requested or ordered in this encounter       Imaging & Consults:  FLULAVAL INFLUENZA VACCINE QUAD PRESERVATIVE FREE 0.5 ML    No follow-ups on file.   There are no Pa

## 2020-01-27 RX ORDER — MONTELUKAST SODIUM 10 MG/1
TABLET ORAL
Qty: 90 TABLET | Refills: 0 | Status: SHIPPED | OUTPATIENT
Start: 2020-01-27 | End: 2020-05-04

## 2020-03-06 ENCOUNTER — TELEPHONE (OUTPATIENT)
Dept: INTERNAL MEDICINE CLINIC | Facility: CLINIC | Age: 25
End: 2020-03-06

## 2020-05-04 RX ORDER — MONTELUKAST SODIUM 10 MG/1
TABLET ORAL
Qty: 90 TABLET | Refills: 0 | Status: SHIPPED | OUTPATIENT
Start: 2020-05-04 | End: 2020-09-14

## 2020-05-04 NOTE — TELEPHONE ENCOUNTER
Last OV 11.18.19 w/ AS (f/up)  Last PE No PE on file   Last REFILL 1.27.20 Montelukast 10mg #90 0R  Last LABS 7.8.19 Testosterone, TSH+FreeT4, Prolactin, Estradiol, CMP, CBC    Future Appointments   Date Time Provider Nika Avalos   5/19/2020 11:15 AM

## 2020-05-13 ENCOUNTER — TELEPHONE (OUTPATIENT)
Dept: INTERNAL MEDICINE CLINIC | Facility: CLINIC | Age: 25
End: 2020-05-13
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
## 2020-05-14 NOTE — TELEPHONE ENCOUNTER
Please call pt to set up a Virtual TE or Video visit with pt for tomorrow 5/15/20 then AS will order testing for him. Thank you.

## 2020-05-14 NOTE — TELEPHONE ENCOUNTER
Patient spoke to his  yesterday who said they would be testing employees in a couple of weeks. When pt came in this am, he asked the staff who said they were not testing employees.   Pt indicates one of the boys is no longer on his unit but

## 2020-05-15 NOTE — TELEPHONE ENCOUNTER
Future Appointments   Date Time Provider Nika Avalos   5/19/2020  9:30 AM Chandler Castellon MD [facility]   6/12/2020 10:15 AM Chandler Castellon MD [facility]

## 2020-05-19 ENCOUNTER — VIRTUAL PHONE E/M (OUTPATIENT)
Dept: INTERNAL MEDICINE CLINIC | Facility: CLINIC | Age: 25
End: 2020-05-19
Payer: COMMERCIAL

## 2020-05-19 ENCOUNTER — TELEPHONE (OUTPATIENT)
Dept: INTERNAL MEDICINE CLINIC | Facility: CLINIC | Age: 25
End: 2020-05-19

## 2020-05-19 DIAGNOSIS — Z20.822 CLOSE EXPOSURE TO 2019 NOVEL CORONAVIRUS: Primary | ICD-10-CM

## 2020-05-19 PROCEDURE — 99213 OFFICE O/P EST LOW 20 MIN: CPT | Performed by: INTERNAL MEDICINE

## 2020-05-19 NOTE — TELEPHONE ENCOUNTER
Adrian Fox - SARS-COV-2 BY PCR More Detail >>   FW: Cancellation of Order # [id number]   Cecilio Nascimento MD   Sent: Tue May 19, 2020 10:04 AM   To: [facility] Clinical Staff          Message     Inform patient that the test was denied   ----- Message

## 2020-05-19 NOTE — TELEPHONE ENCOUNTER
LM for pt at [phone] (M)vm COVID19 testing denied due to short supply of testing supplies. Asked pt to call back if any questions.

## 2020-05-19 NOTE — PROGRESS NOTES
Due to COVID-19 ACTION PLAN, the patient's office visit was converted to a phone or video visit. Time Spent:15 min    Subjective     HPI:   Irwin Louis is a 30year old male who presents for covid exposure.  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

## 2020-06-12 ENCOUNTER — OFFICE VISIT (OUTPATIENT)
Dept: INTERNAL MEDICINE CLINIC | Facility: CLINIC | Age: 25
End: 2020-06-12
Payer: COMMERCIAL

## 2020-06-12 DIAGNOSIS — J45.20 MILD INTERMITTENT ASTHMA WITHOUT COMPLICATION: Primary | ICD-10-CM

## 2020-06-12 DIAGNOSIS — L85.3 DRY SKIN: ICD-10-CM

## 2020-06-12 DIAGNOSIS — E03.8 HYPOTHYROIDISM, SECONDARY: ICD-10-CM

## 2020-06-12 DIAGNOSIS — Z51.81 ENCOUNTER FOR MONITORING TESTOSTERONE REPLACEMENT THERAPY: ICD-10-CM

## 2020-06-12 DIAGNOSIS — F31.70 BIPOLAR AFFECTIVE DISORDER IN REMISSION (HCC): ICD-10-CM

## 2020-06-12 DIAGNOSIS — F64.9 GENDER IDENTITY DISORDER: ICD-10-CM

## 2020-06-12 DIAGNOSIS — Z79.890 ENCOUNTER FOR MONITORING TESTOSTERONE REPLACEMENT THERAPY: ICD-10-CM

## 2020-06-12 DIAGNOSIS — Z13.220 SCREENING CHOLESTEROL LEVEL: ICD-10-CM

## 2020-06-12 DIAGNOSIS — K59.03 DRUG-INDUCED CONSTIPATION: ICD-10-CM

## 2020-06-12 PROCEDURE — 99214 OFFICE O/P EST MOD 30 MIN: CPT | Performed by: INTERNAL MEDICINE

## 2020-06-12 RX ORDER — PRAZOSIN HYDROCHLORIDE 2 MG/1
2 CAPSULE ORAL NIGHTLY
Refills: 0 | COMMUNITY
Start: 2020-06-12

## 2020-06-12 RX ORDER — BENZTROPINE MESYLATE 0.5 MG/1
0.5 TABLET ORAL DAILY
Refills: 0 | COMMUNITY
Start: 2020-06-12

## 2020-06-12 RX ORDER — DEXTROAMPHETAMINE SACCHARATE, AMPHETAMINE ASPARTATE, DEXTROAMPHETAMINE SULFATE AND AMPHETAMINE SULFATE 3.75; 3.75; 3.75; 3.75 MG/1; MG/1; MG/1; MG/1
20 TABLET ORAL 2 TIMES DAILY
COMMUNITY

## 2020-06-12 NOTE — PROGRESS NOTES
Patient presents with: Follow - Up: ELIAN rm 8 f/u      HPI:  Here for f/u of bipolar, asthma and hypothyroid, doing well taking meds no se's. He has chronic constipation, has been slightly worse, needing miralax more often.  Taking meds for his gender transi [717.7]     Contusion of left wrist, initial encounter     Major depression, recurrent ([address]     Self-injurious behavior     SX/GLOBAL/  /NSC/RIGHT CARPAL TUNNEL RELEASE / DOS 12/13/16 / EXP 03/13/17     Left carpal tunnel syndrome     Major depr FOR WHEEZING 18 g 2   • PREVIDENT 5000 DRY MOUTH 1.1 % Dental Gel USE INSTEAD OF REGULAR TOOTHPASTE TO BRUSH TEETH ONCE DAILY  5   • Ketoconazole 2 % External Shampoo use to wash AFFECTED AREAS OF SCALP, BEHIND EARS, AND EYEBROWS DAILY  3   • buPROPion HCl secondary  Gender identity disorder  Bipolar affective disorder in remission (hcc)  Drug-induced constipation  Screening cholesterol level  Encounter for monitoring testosterone replacement therapy  Dry skin  Stable chronic issues, rpt labs, I ordered his

## 2020-09-11 ENCOUNTER — TELEPHONE (OUTPATIENT)
Dept: INTERNAL MEDICINE CLINIC | Facility: CLINIC | Age: 25
End: 2020-09-11

## 2020-09-11 ENCOUNTER — APPOINTMENT (OUTPATIENT)
Dept: INTERNAL MEDICINE CLINIC | Facility: CLINIC | Age: 25
End: 2020-09-11
Payer: COMMERCIAL

## 2020-09-11 NOTE — TELEPHONE ENCOUNTER
Pt scheduled for in office follow up, pt isn't sure what the appt is for. Pt woke up with a bad sore throat. Does AS want to see pt via video visit or just reschedule. Pt stated he doesn't have a fever. Please advise.  High TE

## 2020-09-11 NOTE — TELEPHONE ENCOUNTER
LMTCB to see if pt needs to reschedule. AS tried calling pt for v v for earlier today but pt didn't answer. Per AS, if pt needs an appt, please schedule. If all is ok, no need to reschedule.

## 2020-09-11 NOTE — TELEPHONE ENCOUNTER
Patient states he has a sore throat for the last 24 hours with nasal congestion, no other s/s. Appt changed to Video visit and AS would like to continue with the Video Visit for today 9/11/20 at 10am.  LM for pt on vm to that effect.

## 2020-09-14 RX ORDER — MONTELUKAST SODIUM 10 MG/1
TABLET ORAL
Qty: 90 TABLET | Refills: 0 | Status: SHIPPED | OUTPATIENT
Start: 2020-09-14 | End: 2020-12-28

## 2020-09-14 NOTE — TELEPHONE ENCOUNTER
Last OV: 6/12/20 f/u    No future appointments.      Latest labs: 7/17/20 CBC, CMP and Lipid    Latest RX: MONTELUKAST 10MG TABLET 90 tabs 0 refills on 5/4/20    Per protocol, failed due to Asthma Action Score greater than or equal to 20 and AAP/ACT given i

## 2020-12-26 NOTE — TELEPHONE ENCOUNTER
Last VISIT 06/12/20       Last REFILL 09/14/20 qty 90 w/0 refills    Last LABS 07/17/20 Testosterone done    No future appointments. Per PROTOCOL? Failed    Please Approve or Deny.

## 2020-12-28 RX ORDER — MONTELUKAST SODIUM 10 MG/1
10 TABLET ORAL DAILY
Qty: 90 TABLET | Refills: 0 | Status: SHIPPED | OUTPATIENT
Start: 2020-12-28 | End: 2021-02-16

## 2021-02-15 NOTE — TELEPHONE ENCOUNTER
Pt requesting refill of singulair rx to be sent to the  [facility] on file, please advise and let pt know once refiled?

## 2021-02-16 RX ORDER — MONTELUKAST SODIUM 10 MG/1
10 TABLET ORAL DAILY
Qty: 90 TABLET | Refills: 0 | Status: SHIPPED | OUTPATIENT
Start: 2021-02-16 | End: 2021-05-17

## 2021-02-16 NOTE — TELEPHONE ENCOUNTER
Last VISIT 06/12/20    Last REFILL 12/28/20 qty 90 w/0 refills    Last LABS 07/17/20 Testosterone done    No Future Appointments      Please Approve or Deny.

## 2021-03-07 PROCEDURE — 93010 ELECTROCARDIOGRAM REPORT: CPT | Performed by: INTERNAL MEDICINE

## 2021-04-15 ENCOUNTER — LAB ENCOUNTER (OUTPATIENT)
Dept: LAB | Age: 26
End: 2021-04-15
Attending: FAMILY MEDICINE
Payer: COMMERCIAL

## 2021-04-15 DIAGNOSIS — R63.5 ABNORMAL WEIGHT GAIN: ICD-10-CM

## 2021-04-15 DIAGNOSIS — E34.9 ENDOCRINE DISEASE: ICD-10-CM

## 2021-04-15 DIAGNOSIS — E06.3 AUTOIMMUNE THYROIDITIS: ICD-10-CM

## 2021-04-15 DIAGNOSIS — E03.9 HYPOTHYROID: Primary | ICD-10-CM

## 2021-04-15 PROCEDURE — 36415 COLL VENOUS BLD VENIPUNCTURE: CPT

## 2021-04-15 PROCEDURE — 83036 HEMOGLOBIN GLYCOSYLATED A1C: CPT

## 2021-04-15 PROCEDURE — 84403 ASSAY OF TOTAL TESTOSTERONE: CPT

## 2021-04-15 PROCEDURE — 84443 ASSAY THYROID STIM HORMONE: CPT

## 2021-04-15 PROCEDURE — 85025 COMPLETE CBC W/AUTO DIFF WBC: CPT

## 2021-04-15 PROCEDURE — 82670 ASSAY OF TOTAL ESTRADIOL: CPT

## 2021-04-15 PROCEDURE — 82306 VITAMIN D 25 HYDROXY: CPT

## 2021-04-15 PROCEDURE — 84439 ASSAY OF FREE THYROXINE: CPT

## 2021-04-15 PROCEDURE — 80053 COMPREHEN METABOLIC PANEL: CPT

## 2021-04-15 PROCEDURE — 80061 LIPID PANEL: CPT

## 2021-05-15 NOTE — TELEPHONE ENCOUNTER
Last VISIT 06/12/20    Last REFILL 02/16/21 qty 90 w/0 refills    Last LABS 04/15/21 Multiple labs done    No future appointments. Per PROTOCOL? Failed    Please Approve or Deny.

## 2021-05-17 RX ORDER — MONTELUKAST SODIUM 10 MG/1
TABLET ORAL
Qty: 90 TABLET | Refills: 0 | Status: SHIPPED | OUTPATIENT
Start: 2021-05-17 | End: 2021-08-04

## 2021-05-24 ENCOUNTER — PATIENT MESSAGE (OUTPATIENT)
Dept: INTERNAL MEDICINE CLINIC | Facility: CLINIC | Age: 26
End: 2021-05-24

## 2021-05-24 NOTE — TELEPHONE ENCOUNTER
From: Jazmin Escobar  To: Frank Hernandez MD  Sent: 5/24/2021 1:33 PM CDT  Subject: Non-Urgent Huey Parkinson,    My mom told me you got some kind of promotion and are no longer seeing patients, or seeing fewer patients?  I need to DR BRONSON SHORT [facility]

## 2021-06-07 RX ORDER — ALBUTEROL SULFATE 2.5 MG/3ML
SOLUTION RESPIRATORY (INHALATION)
Qty: 300 ML | Refills: 0 | OUTPATIENT
Start: 2021-06-07

## 2021-06-07 NOTE — TELEPHONE ENCOUNTER
Last OV 6.12.20 w/ AS (f/up)   Last PE No recent PE on file   Last REFILL 4.1.19 Ventolin HFA 108mcg/act #18g 2R  Last LABS 7.17.20 Testosterone     No future appointments. Per PROTOCOL?  FAILED-whole protocol     Please Advise

## 2021-06-09 RX ORDER — ALBUTEROL SULFATE 2.5 MG/3ML
SOLUTION RESPIRATORY (INHALATION)
Qty: 300 ML | Refills: 0 | Status: SHIPPED | OUTPATIENT
Start: 2021-06-09

## 2021-06-09 NOTE — TELEPHONE ENCOUNTER
Last OV: 6/12/20 f/u    No future appointments.      Latest labs: 3/1/19 ACT- score 20    Latest RX: Albuterol neb- 100 vial 1 refill on 4/1/19    Per protocol, failed due to Asthma Action Score greater than or equal to 20, Appointment in past 6 or next 3 m

## 2021-06-15 ENCOUNTER — TELEPHONE (OUTPATIENT)
Dept: INTERNAL MEDICINE CLINIC | Facility: CLINIC | Age: 26
End: 2021-06-15

## 2021-06-15 DIAGNOSIS — Z13.0 SCREENING FOR BLOOD DISEASE: ICD-10-CM

## 2021-06-15 DIAGNOSIS — Z13.29 SCREENING FOR THYROID DISORDER: ICD-10-CM

## 2021-06-15 DIAGNOSIS — Z13.228 SCREENING FOR METABOLIC DISORDER: ICD-10-CM

## 2021-06-15 DIAGNOSIS — Z13.220 SCREENING FOR LIPID DISORDERS: ICD-10-CM

## 2021-06-15 DIAGNOSIS — Z00.00 ROUTINE GENERAL MEDICAL EXAMINATION AT A HEALTH CARE FACILITY: Primary | ICD-10-CM

## 2021-06-15 NOTE — TELEPHONE ENCOUNTER
CPE   Future Appointments   Date Time Provider Nika Bailey   9/29/2021  4:00 PM Chato Florian MD [facility]        Orders to [facility]  aware must fast no call back required

## 2021-07-08 ENCOUNTER — OFFICE VISIT (OUTPATIENT)
Dept: INTERNAL MEDICINE CLINIC | Facility: CLINIC | Age: 26
End: 2021-07-08
Payer: COMMERCIAL

## 2021-07-08 VITALS
TEMPERATURE: 99 F | SYSTOLIC BLOOD PRESSURE: 120 MMHG | OXYGEN SATURATION: 97 % | WEIGHT: 195.63 LBS | HEART RATE: 83 BPM | HEIGHT: 65 IN | DIASTOLIC BLOOD PRESSURE: 66 MMHG | BODY MASS INDEX: 32.6 KG/M2

## 2021-07-08 DIAGNOSIS — F31.9 BIPOLAR AFFECTIVE DISORDER, REMISSION STATUS UNSPECIFIED (HCC): ICD-10-CM

## 2021-07-08 DIAGNOSIS — J45.30 MILD PERSISTENT ASTHMA WITHOUT COMPLICATION: Primary | ICD-10-CM

## 2021-07-08 PROCEDURE — 3074F SYST BP LT 130 MM HG: CPT | Performed by: PHYSICIAN ASSISTANT

## 2021-07-08 PROCEDURE — 3078F DIAST BP <80 MM HG: CPT | Performed by: PHYSICIAN ASSISTANT

## 2021-07-08 PROCEDURE — 3008F BODY MASS INDEX DOCD: CPT | Performed by: PHYSICIAN ASSISTANT

## 2021-07-08 PROCEDURE — 99214 OFFICE O/P EST MOD 30 MIN: CPT | Performed by: PHYSICIAN ASSISTANT

## 2021-07-08 RX ORDER — SALMETEROL XINAFOATE 50 MCG
1 BLISTER, WITH INHALATION DEVICE INHALATION 2 TIMES DAILY
Qty: 60 EACH | Refills: 1 | Status: SHIPPED | OUTPATIENT
Start: 2021-07-08 | End: 2021-09-29

## 2021-07-08 NOTE — PROGRESS NOTES
Patient presents with: Follow - Up: mn room 6 pt here for asthma follow up       HPI:  Pt presents for follow up of asthma. Pt reports he has has had mild asthma since he was a child, never \"exercise induced\" usually more after URI or other illness.   O List:     Chondromalacia of patella     Disorder of bursae and tendons in shoulder region     Superior glenoid labrum lesion     Other joint derangement, not elsewhere classified, shoulder region     Other specified disorders of rotator cuff syndrome of sh DISKUS) 50 MCG/DOSE Inhalation Aerosol Powder, Breath Activated Inhale 1 puff into the lungs 2 (two) times daily.  60 each 1   • ALBUTEROL SULFATE (2.5 MG/3ML) 0.083% Inhalation Nebu Soln USE 1 AMPULE IN NEBULIZER EVERY SIX HOURS AS NEEDED FOR WHEEZING 300 every 6 (six) hours as needed for Pain.          Physical Exam  /66 (BP Location: Right arm, Patient Position: Sitting, Cuff Size: adult)   Pulse 83   Temp 98.8 °F (37.1 °C) (Temporal)   Ht 5' 5\" (1.651 m)   Wt 195 lb 9.6 oz (88.7 kg)   SpO2 97%   BM EHR, independently interpreting results and communicating results to the patient/family/caregiver and care coordination with the patient's other providers. No orders of the defined types were placed in this encounter.       Meds & Refills for this Visi

## 2021-08-04 RX ORDER — MONTELUKAST SODIUM 10 MG/1
TABLET ORAL
Qty: 90 TABLET | Refills: 0 | Status: SHIPPED | OUTPATIENT
Start: 2021-08-04 | End: 2021-12-15

## 2021-08-04 NOTE — TELEPHONE ENCOUNTER
Last visit- 07/08/2021 mild persistent asthma    Last refill-  05/17/2021 montelukast sodium 10mg QTY90 0R    Last labs-  04/15/2021 vitamin d, hemoglobin a1c, cbc, lipid, cmp   Ordered by Arnold German MD  Future Appointments   Date Time Provider Richard

## 2021-09-24 ENCOUNTER — LAB ENCOUNTER (OUTPATIENT)
Dept: LAB | Age: 26
End: 2021-09-24
Attending: FAMILY MEDICINE
Payer: COMMERCIAL

## 2021-09-24 ENCOUNTER — LAB ENCOUNTER (OUTPATIENT)
Dept: LAB | Age: 26
End: 2021-09-24
Attending: INTERNAL MEDICINE
Payer: COMMERCIAL

## 2021-09-24 DIAGNOSIS — Z13.0 SCREENING FOR BLOOD DISEASE: ICD-10-CM

## 2021-09-24 DIAGNOSIS — Z13.228 SCREENING FOR METABOLIC DISORDER: ICD-10-CM

## 2021-09-24 DIAGNOSIS — Z13.29 SCREENING FOR THYROID DISORDER: ICD-10-CM

## 2021-09-24 DIAGNOSIS — E34.9 ENDOCRINE DISORDER: Primary | ICD-10-CM

## 2021-09-24 DIAGNOSIS — Z13.220 SCREENING FOR LIPID DISORDERS: ICD-10-CM

## 2021-09-24 DIAGNOSIS — Z00.00 ROUTINE GENERAL MEDICAL EXAMINATION AT A HEALTH CARE FACILITY: ICD-10-CM

## 2021-09-24 LAB
ALBUMIN SERPL-MCNC: 3.9 G/DL (ref 3.4–5)
ALBUMIN/GLOB SERPL: 1.1 {RATIO} (ref 1–2)
ALP LIVER SERPL-CCNC: 85 U/L
ALT SERPL-CCNC: 24 U/L
ANION GAP SERPL CALC-SCNC: 4 MMOL/L (ref 0–18)
AST SERPL-CCNC: 25 U/L (ref 15–37)
BASOPHILS # BLD AUTO: 0.02 X10(3) UL (ref 0–0.2)
BASOPHILS NFR BLD AUTO: 0.3 %
BILIRUB SERPL-MCNC: 0.8 MG/DL (ref 0.1–2)
BUN BLD-MCNC: 15 MG/DL (ref 7–18)
CALCIUM BLD-MCNC: 9.3 MG/DL (ref 8.5–10.1)
CHLORIDE SERPL-SCNC: 104 MMOL/L (ref 98–112)
CHOLEST SERPL-MCNC: 182 MG/DL (ref ?–200)
CO2 SERPL-SCNC: 30 MMOL/L (ref 21–32)
CREAT BLD-MCNC: 1.13 MG/DL
EOSINOPHIL # BLD AUTO: 0.05 X10(3) UL (ref 0–0.7)
EOSINOPHIL NFR BLD AUTO: 0.8 %
ERYTHROCYTE [DISTWIDTH] IN BLOOD BY AUTOMATED COUNT: 12.1 %
EST. AVERAGE GLUCOSE BLD GHB EST-MCNC: 100 MG/DL (ref 68–126)
ESTRADIOL SERPL-MCNC: 24.2 PG/ML
GLOBULIN PLAS-MCNC: 3.6 G/DL (ref 2.8–4.4)
GLUCOSE BLD-MCNC: 87 MG/DL (ref 70–99)
HBA1C MFR BLD HPLC: 5.1 % (ref ?–5.7)
HCT VFR BLD AUTO: 46.7 %
HDLC SERPL-MCNC: 53 MG/DL (ref 40–59)
HGB BLD-MCNC: 15.8 G/DL
IMM GRANULOCYTES # BLD AUTO: 0.02 X10(3) UL (ref 0–1)
IMM GRANULOCYTES NFR BLD: 0.3 %
LDLC SERPL CALC-MCNC: 106 MG/DL (ref ?–100)
LYMPHOCYTES # BLD AUTO: 1.91 X10(3) UL (ref 1–4)
LYMPHOCYTES NFR BLD AUTO: 30.5 %
MCH RBC QN AUTO: 30.5 PG (ref 26–34)
MCHC RBC AUTO-ENTMCNC: 33.8 G/DL (ref 31–37)
MCV RBC AUTO: 90.2 FL
MONOCYTES # BLD AUTO: 0.52 X10(3) UL (ref 0.1–1)
MONOCYTES NFR BLD AUTO: 8.3 %
NEUTROPHILS # BLD AUTO: 3.74 X10 (3) UL (ref 1.5–7.7)
NEUTROPHILS # BLD AUTO: 3.74 X10(3) UL (ref 1.5–7.7)
NEUTROPHILS NFR BLD AUTO: 59.8 %
NONHDLC SERPL-MCNC: 129 MG/DL (ref ?–130)
OSMOLALITY SERPL CALC.SUM OF ELEC: 286 MOSM/KG (ref 275–295)
PATIENT FASTING Y/N/NP: YES
PATIENT FASTING Y/N/NP: YES
PLATELET # BLD AUTO: 197 10(3)UL (ref 150–450)
POTASSIUM SERPL-SCNC: 4.1 MMOL/L (ref 3.5–5.1)
PROT SERPL-MCNC: 7.5 G/DL (ref 6.4–8.2)
RBC # BLD AUTO: 5.18 X10(6)UL
SODIUM SERPL-SCNC: 138 MMOL/L (ref 136–145)
T4 FREE SERPL-MCNC: 0.9 NG/DL (ref 0.8–1.7)
TESTOST SERPL-MCNC: 227.35 NG/DL
TRIGL SERPL-MCNC: 128 MG/DL (ref 30–149)
TSI SER-ACNC: 1.04 MIU/ML (ref 0.36–3.74)
VLDLC SERPL CALC-MCNC: 22 MG/DL (ref 0–30)
WBC # BLD AUTO: 6.3 X10(3) UL (ref 4–11)

## 2021-09-24 PROCEDURE — 36415 COLL VENOUS BLD VENIPUNCTURE: CPT

## 2021-09-24 PROCEDURE — 83036 HEMOGLOBIN GLYCOSYLATED A1C: CPT

## 2021-09-24 PROCEDURE — 80061 LIPID PANEL: CPT

## 2021-09-24 PROCEDURE — 84443 ASSAY THYROID STIM HORMONE: CPT

## 2021-09-24 PROCEDURE — 80053 COMPREHEN METABOLIC PANEL: CPT

## 2021-09-24 PROCEDURE — 84403 ASSAY OF TOTAL TESTOSTERONE: CPT

## 2021-09-24 PROCEDURE — 85025 COMPLETE CBC W/AUTO DIFF WBC: CPT

## 2021-09-24 PROCEDURE — 82670 ASSAY OF TOTAL ESTRADIOL: CPT

## 2021-09-24 PROCEDURE — 84439 ASSAY OF FREE THYROXINE: CPT

## 2021-09-29 ENCOUNTER — OFFICE VISIT (OUTPATIENT)
Dept: INTERNAL MEDICINE CLINIC | Facility: CLINIC | Age: 26
End: 2021-09-29
Payer: COMMERCIAL

## 2021-09-29 VITALS
WEIGHT: 206 LBS | DIASTOLIC BLOOD PRESSURE: 82 MMHG | TEMPERATURE: 98 F | SYSTOLIC BLOOD PRESSURE: 114 MMHG | BODY MASS INDEX: 34.32 KG/M2 | HEART RATE: 102 BPM | HEIGHT: 65 IN

## 2021-09-29 DIAGNOSIS — Z00.00 PE (PHYSICAL EXAM), ANNUAL: Primary | ICD-10-CM

## 2021-09-29 PROCEDURE — 99395 PREV VISIT EST AGE 18-39: CPT | Performed by: INTERNAL MEDICINE

## 2021-09-29 PROCEDURE — 3079F DIAST BP 80-89 MM HG: CPT | Performed by: INTERNAL MEDICINE

## 2021-09-29 PROCEDURE — 3074F SYST BP LT 130 MM HG: CPT | Performed by: INTERNAL MEDICINE

## 2021-09-29 PROCEDURE — 3008F BODY MASS INDEX DOCD: CPT | Performed by: INTERNAL MEDICINE

## 2021-09-29 RX ORDER — BUPROPION HYDROCHLORIDE 150 MG/1
150 TABLET ORAL EVERY MORNING
COMMUNITY
Start: 2021-09-09

## 2021-09-29 RX ORDER — HALOPERIDOL 2 MG/1
2 TABLET ORAL NIGHTLY
COMMUNITY
Start: 2021-07-27

## 2021-10-01 NOTE — PROGRESS NOTES
Patient presents with:  Physical: AJ rm 9 annual PE      HPI:  Here for cpe. Stable chronic medical problems, on meds, no se's. Review of Systems   No f/c/chest pain or sob. No cough. No abd pain/n/v/d. No ha or dizziness.  No numbness, tingling, or wea CARPAL TUNNEL RELEASE / DOS 12/13/16 / EXP 03/13/17     Left carpal tunnel syndrome     Major depression     Infectious mononucleosis without complication     Major depressive disorder, recurrent ([facility]     Bipolar 1 disorder ([facility]     PTSD (post-traumatic Ketoconazole 2 % External Shampoo use to wash AFFECTED AREAS OF SCALP, BEHIND EARS, AND EYEBROWS DAILY  3   • buPROPion HCl ER, XL, 300 MG Oral Tablet 24 Hr TAKE 1 TABLET BY MOUTH IN THE MORNING  2   • temazepam 15 MG Oral Cap Take 45 mg by mouth daily. continue meds, continue f/u with psych and endo  No orders of the defined types were placed in this encounter.       Meds & Refills for this Visit:  Requested Prescriptions      No prescriptions requested or ordered in this encounter       Imaging & Consult

## 2021-12-15 RX ORDER — MONTELUKAST SODIUM 10 MG/1
TABLET ORAL
Qty: 90 TABLET | Refills: 0 | Status: SHIPPED | OUTPATIENT
Start: 2021-12-15

## 2022-02-04 ENCOUNTER — TELEPHONE (OUTPATIENT)
Dept: INTERNAL MEDICINE CLINIC | Facility: CLINIC | Age: 27
End: 2022-02-04

## 2022-02-04 NOTE — TELEPHONE ENCOUNTER
Pt stated he hadn't had a BM for 10 days but then was able to go and now it's been 7 days - pt stated he was feeling a lot of pain on Monday, 1/31 and had to leave work. Pt stated 10 ducolax chews and only liquid came out. Pt looking for an appt with AS but didn't have an to offer, pt stated he will wait for him as he has an appt with him on below.  High TE    Future Appointments   Date Time Provider Nika Bailey   3/23/2022  4:30 PM Charla Wood MD [facility]

## 2022-02-04 NOTE — TELEPHONE ENCOUNTER
Spoke to pt. Pt said that he didn't have a BM for about 7 days. On Monday he was in pain from this so took Ducolax. Started with recommend dosing on package but ended up taking 10 total because it was not working. Pt had BM on Monday and another on Wednesday. These BM's were liquid. Pt said he normally has BM every 3-4 days. Pt said that he is on Cogentin, which causes him to be constipated. Takes 2 stool softeners in morning and evening. Pt said constipation seems to be getting worse so he is unsure if there is another issue. Pt only wanting appt with AS.     AS, do you have any recommendations for pt? Not scheduled with you until 3/23.

## 2022-03-07 RX ORDER — MONTELUKAST SODIUM 10 MG/1
TABLET ORAL
Qty: 90 TABLET | Refills: 0 | Status: SHIPPED | OUTPATIENT
Start: 2022-03-07

## 2022-03-23 ENCOUNTER — OFFICE VISIT (OUTPATIENT)
Dept: INTERNAL MEDICINE CLINIC | Facility: CLINIC | Age: 27
End: 2022-03-23
Payer: COMMERCIAL

## 2022-03-23 VITALS
TEMPERATURE: 97 F | HEART RATE: 94 BPM | DIASTOLIC BLOOD PRESSURE: 86 MMHG | RESPIRATION RATE: 18 BRPM | HEIGHT: 66.54 IN | SYSTOLIC BLOOD PRESSURE: 138 MMHG | WEIGHT: 226.38 LBS | BODY MASS INDEX: 35.95 KG/M2 | OXYGEN SATURATION: 98 %

## 2022-03-23 DIAGNOSIS — K59.09 CHRONIC CONSTIPATION: ICD-10-CM

## 2022-03-23 DIAGNOSIS — F33.2 SEVERE EPISODE OF RECURRENT MAJOR DEPRESSIVE DISORDER, WITHOUT PSYCHOTIC FEATURES (HCC): ICD-10-CM

## 2022-03-23 DIAGNOSIS — R61 EXCESSIVE SWEATING: ICD-10-CM

## 2022-03-23 DIAGNOSIS — F31.9 BIPOLAR 1 DISORDER (HCC): ICD-10-CM

## 2022-03-23 DIAGNOSIS — Z72.89 SELF-INJURIOUS BEHAVIOR: ICD-10-CM

## 2022-03-23 DIAGNOSIS — J45.30 MILD PERSISTENT ASTHMA WITHOUT COMPLICATION: ICD-10-CM

## 2022-03-23 DIAGNOSIS — R63.5 WEIGHT GAIN: Primary | ICD-10-CM

## 2022-03-23 DIAGNOSIS — E03.8 HYPOTHYROIDISM, SECONDARY: ICD-10-CM

## 2022-03-23 PROCEDURE — 3075F SYST BP GE 130 - 139MM HG: CPT | Performed by: INTERNAL MEDICINE

## 2022-03-23 PROCEDURE — 99214 OFFICE O/P EST MOD 30 MIN: CPT | Performed by: INTERNAL MEDICINE

## 2022-03-23 PROCEDURE — 3008F BODY MASS INDEX DOCD: CPT | Performed by: INTERNAL MEDICINE

## 2022-03-23 PROCEDURE — 3079F DIAST BP 80-89 MM HG: CPT | Performed by: INTERNAL MEDICINE

## 2022-03-23 RX ORDER — DULOXETIN HYDROCHLORIDE 30 MG/1
30 CAPSULE, DELAYED RELEASE ORAL EVERY MORNING
COMMUNITY
Start: 2021-12-29

## 2022-03-23 RX ORDER — HALOPERIDOL 2 MG/1
2 TABLET ORAL 2 TIMES DAILY
Refills: 0 | COMMUNITY
Start: 2022-03-23

## 2022-03-23 RX ORDER — HYDROXYZINE PAMOATE 25 MG/1
25 CAPSULE ORAL 3 TIMES DAILY
COMMUNITY
Start: 2022-02-24

## 2022-03-23 RX ORDER — BUPROPION HYDROCHLORIDE 150 MG/1
450 TABLET ORAL DAILY
Refills: 0 | COMMUNITY
Start: 2022-03-23

## 2022-03-23 RX ORDER — PRAZOSIN HYDROCHLORIDE 2 MG/1
8 CAPSULE ORAL NIGHTLY
Refills: 0 | COMMUNITY
Start: 2022-03-23

## 2022-03-23 RX ORDER — DEXTROAMPHETAMINE SACCHARATE, AMPHETAMINE ASPARTATE, DEXTROAMPHETAMINE SULFATE AND AMPHETAMINE SULFATE 3.75; 3.75; 3.75; 3.75 MG/1; MG/1; MG/1; MG/1
15 TABLET ORAL 2 TIMES DAILY
Refills: 0 | COMMUNITY
Start: 2022-03-23

## 2022-03-23 RX ORDER — CARIPRAZINE 3 MG/1
1 CAPSULE, GELATIN COATED ORAL DAILY
COMMUNITY
Start: 2022-03-09

## 2022-03-24 PROBLEM — F45.22 BODY DYSMORPHIC DISORDER: Status: ACTIVE | Noted: 2022-03-24

## 2022-04-03 ENCOUNTER — TELEPHONE (OUTPATIENT)
Dept: INTERNAL MEDICINE CLINIC | Facility: CLINIC | Age: 27
End: 2022-04-03

## 2022-04-03 NOTE — TELEPHONE ENCOUNTER
Patient paged today with new-onset seizure occurring yesterday. He did suffer head trauma. Declined EMS-assisted transport or ED evaluation secondary to cost.  No current described postictal symptoms. I advised him to promptly go to the emergency department for evaluation at this time. He is agreeable.

## 2022-04-04 NOTE — TELEPHONE ENCOUNTER
Pt is aware of the need to go to the ED. He agreed to go over the weekend to [facility] and today, to me. Now he is stating he will not go due to cost. He requesting AS order labs. AS, unsure of what further to advise to pt.

## 2022-04-04 NOTE — TELEPHONE ENCOUNTER
LOV 3/23/22    Pt did not go to ED. States he is fine today. Reports he didn't want to go to the ED and have them take his license away. Advised all providers are required report unsafe driving due to LOC if they feel necessary, not just in the ED. Advised needs to go to ED and he declines. He would rather be seen in office. He is aware this requires further work-up and ED resources are necessary. Confirmed the seizure is new onset. Happened Saturday. Has not driven since. Is currently at work, had a friend drive him. Will discuss with [address], on call.

## 2022-04-04 NOTE — TELEPHONE ENCOUNTER
SAGAR AS    Will hold in RN aydin. Triage, please check tomorrow and confirm pt went to ED. [address] agreeable pt still needs to go to ED. Discussed with pt, explained again risks of not going and reasons eval is needed. Verbs understanding and agreeable to go to ED. Informed we will keep an eye out for ED records and should f/u here after. Verbs understanding.

## 2022-04-04 NOTE — TELEPHONE ENCOUNTER
Pt reports he cannot go to the ED. He cannot afford to go there. States he already pays for a psychiatrist and other psych services every couple weeks, states \"my mental health is very expensive\". He doesn't even know that he had a seizure. Reports the people he was with don't know what a seizure looks like, \"they are not medically trained\". States he thinks he just passed out, which has happened before in past due to his haldol. Advised pt, that we understand his concern, but this recommendation is for his safety.  Pt continues to decline ED due to cost.

## 2022-04-04 NOTE — TELEPHONE ENCOUNTER
Pt doesn't wants to go to the ER because its expensive and cant afford it. Wants to know if AS will just order the labs.

## 2022-04-18 ENCOUNTER — LAB ENCOUNTER (OUTPATIENT)
Dept: LAB | Facility: HOSPITAL | Age: 27
End: 2022-04-18
Attending: FAMILY MEDICINE
Payer: COMMERCIAL

## 2022-04-18 ENCOUNTER — TELEPHONE (OUTPATIENT)
Dept: INTERNAL MEDICINE CLINIC | Facility: CLINIC | Age: 27
End: 2022-04-18

## 2022-04-18 ENCOUNTER — TELEMEDICINE (OUTPATIENT)
Dept: INTERNAL MEDICINE CLINIC | Facility: CLINIC | Age: 27
End: 2022-04-18
Payer: COMMERCIAL

## 2022-04-18 DIAGNOSIS — J02.9 SORE THROAT: ICD-10-CM

## 2022-04-18 DIAGNOSIS — J01.90 ACUTE NON-RECURRENT SINUSITIS, UNSPECIFIED LOCATION: Primary | ICD-10-CM

## 2022-04-18 DIAGNOSIS — J01.90 ACUTE NON-RECURRENT SINUSITIS, UNSPECIFIED LOCATION: ICD-10-CM

## 2022-04-18 PROCEDURE — 99213 OFFICE O/P EST LOW 20 MIN: CPT | Performed by: FAMILY MEDICINE

## 2022-04-18 RX ORDER — AMOXICILLIN AND CLAVULANATE POTASSIUM 875; 125 MG/1; MG/1
1 TABLET, FILM COATED ORAL 2 TIMES DAILY
Qty: 14 TABLET | Refills: 0 | Status: SHIPPED | OUTPATIENT
Start: 2022-04-18 | End: 2022-04-25

## 2022-04-18 NOTE — PROGRESS NOTES
Due to COVID-19 ACTION PLAN, the patient's office visit was converted to a video visit with informed patient consent. Time Spent: 11 min    Subjective     HPI:   Jez Ansari is a 32year old male who presents for evaluation of one week of sinus congestion/pressure with HA, PND, and sore throat. He had a negative rapid COVID test on day 2 of symptoms. He works in a mental health facility. No fever or chills. REVIEW OF SYSTEMS:  GENERAL: No fever, chills, see HPI    Physical Exam:  Appears well on exam. Congested. Assessment and Plan:  Jez Ansari is presenting for one week of sinus pressure with associated HA, PND, and sore throat. Will obtain COVID PCR as his rapid was not properly timed. Discussed symptomatic care and Rx sent for Augmentin given duration of illness. 1. Acute non-recurrent sinusitis  - SARS-COV-2 BY PCR ([facility]); Future  - amoxicillin clavulanate (AUGMENTIN) 875-125 MG Oral Tab; Take 1 tablet by mouth 2 (two) times daily for 7 days. Dispense: 14 tablet; Refill: 0  2. Sore throat  - SARS-COV-2 BY PCR ([facility]); Future    Silvino Meza MD    Jez Ansari understands phone evaluation is not a substitute for face-to-face examination or emergency care. Patient advised to go to ER or call 911 for worsening symptoms or acute distress. Please note that the following visit was completed using two-way, real-time interactive video communication. This has been done in good corrine to provide continuity of care in the best interest of the provider-patient relationship, due to the on-going public health crisis/national emergency and because of restrictions of visitation. There are limitations of this visit as no physical exam could be performed. Every conscious effort was taken to allow for sufficient and adequate time. This billing visit was spent on reviewing labs, medications, radiology tests and decision making.   Appropriate medical decision-making and tests are ordered as detailed in the plan of care above.

## 2022-04-18 NOTE — TELEPHONE ENCOUNTER
Patient states that he works for a [employer], started s/s last Wed- productive cough, yellow mucus, sore and swollen throat,  H/a, PND, loosing voice, a little wheezing. Pt had a COVID test on Wed 4/13 which was negative, no known COVID exposure,  vaccinated and boosted. Patient denies fever, cp, sob. Virtual Visit scheduled with [facility] for today 4/18/22 at 2:40pm.  Pt verbalizes understanding.   FYI to [facility]

## 2022-04-18 NOTE — TELEPHONE ENCOUNTER
Pt started with symptoms last Wed the 13th. He took a covid test that day and it was negative but symptoms have worsened. Cough, sore throat, headache , post nasal drip. He is vaccinated with 1 booster. Pt would like to be seen in person.

## 2022-04-19 ENCOUNTER — TELEMEDICINE (OUTPATIENT)
Dept: INTERNAL MEDICINE CLINIC | Facility: CLINIC | Age: 27
End: 2022-04-19
Payer: COMMERCIAL

## 2022-04-19 ENCOUNTER — TELEPHONE (OUTPATIENT)
Dept: INTERNAL MEDICINE CLINIC | Facility: CLINIC | Age: 27
End: 2022-04-19

## 2022-04-19 DIAGNOSIS — U07.1 COVID-19 VIRUS INFECTION: Primary | ICD-10-CM

## 2022-04-19 LAB — SARS-COV-2 RNA RESP QL NAA+PROBE: DETECTED

## 2022-04-19 PROCEDURE — 99213 OFFICE O/P EST LOW 20 MIN: CPT | Performed by: FAMILY MEDICINE

## 2022-04-19 NOTE — PROGRESS NOTES
Due to COVID-19 ACTION PLAN, the patient's office visit was converted to a video visit with informed patient consent. Time Spent: 10 min    Subjective     HPI:   Nadine Smith is a 32year old male who presents for follow-up. His COVID PCR came back positive today. Symptoms began on 4/12. Is no longer having fevers. Slow improvement in upper respiratory and cough symptoms. No CP or SOB. REVIEW OF SYSTEMS:  GENERAL: No fever, chills, see HPI    Physical Exam:  Appears well on exam. Congested, mild cough. Assessment and Plan:  Nadine Smith is presenting for follow-up. COVID PCR positive today. Symptoms began on 4/12. Recommended full 10 days of isolation given persistence of symptoms as long has symptoms are improving and stays afebrile at that time. Discussed continued supportive care and precautions/warning signs. 1. COVID-19 virus infection      Fidel Bailey MD    Nadine Smith understands phone evaluation is not a substitute for face-to-face examination or emergency care. Patient advised to go to ER or call 911 for worsening symptoms or acute distress. Please note that the following visit was completed using two-way, real-time interactive video communication. This has been done in good corrine to provide continuity of care in the best interest of the provider-patient relationship, due to the on-going public health crisis/national emergency and because of restrictions of visitation. There are limitations of this visit as no physical exam could be performed. Every conscious effort was taken to allow for sufficient and adequate time. This billing visit was spent on reviewing labs, medications, radiology tests and decision making. Appropriate medical decision-making and tests are ordered as detailed in the plan of care above.

## 2022-04-19 NOTE — TELEPHONE ENCOUNTER
LM for pt at [phone] ([phone] to call back. [address] would like to schedule a VV with pt to discuss results of positive COVID testing from 4/18/22.

## 2022-04-19 NOTE — TELEPHONE ENCOUNTER
Pt scheduled for   Future Appointments   Date Time Provider [facility]   4/19/2022  3:00 PM Ita Ya MD [facility]   5/12/2022  1:20 PM Heather Scott MD [facility]   5/16/2022 10:40 AM Celia Kruse MD [facility] [place]   5/20/2022  2:00 PM Chidi Jaeger MD [facility]

## 2022-04-20 RX ORDER — DULOXETIN HYDROCHLORIDE 60 MG/1
60 CAPSULE, DELAYED RELEASE ORAL 2 TIMES DAILY
COMMUNITY
Start: 2022-04-14

## 2022-04-20 RX ORDER — SODIUM FLUORIDE 6 MG/ML
PASTE, DENTIFRICE DENTAL
COMMUNITY
Start: 2022-04-15

## 2022-04-20 RX ORDER — DEXTROAMPHETAMINE SACCHARATE, AMPHETAMINE ASPARTATE, DEXTROAMPHETAMINE SULFATE AND AMPHETAMINE SULFATE 5; 5; 5; 5 MG/1; MG/1; MG/1; MG/1
1 TABLET ORAL DAILY
COMMUNITY
Start: 2022-04-18

## 2022-04-21 ENCOUNTER — TELEPHONE (OUTPATIENT)
Dept: INTERNAL MEDICINE CLINIC | Facility: CLINIC | Age: 27
End: 2022-04-21

## 2022-04-21 NOTE — TELEPHONE ENCOUNTER
Pt did VV w/[facility] on 4/19 for cough-he needs to go back to work on Saturday and needs a note that he is ok to go back-please put note on mychart to return to work on 4/23/22.

## 2022-04-21 NOTE — TELEPHONE ENCOUNTER
Pt seen for VV with [facility] 4/19/22. Covid PCR positive on 4/19/22, collected on 4/18/22. Sx began 4/12/22. [place], ok for return to work note given it will be over 10 days since sx onset?

## 2022-05-12 PROBLEM — F64.9 GENDER DYSPHORIA: Status: ACTIVE | Noted: 2018-12-17

## 2022-05-16 ENCOUNTER — OFFICE VISIT (OUTPATIENT)
Dept: NEUROLOGY | Facility: CLINIC | Age: 27
End: 2022-05-16
Payer: COMMERCIAL

## 2022-05-16 VITALS
RESPIRATION RATE: 16 BRPM | SYSTOLIC BLOOD PRESSURE: 122 MMHG | HEART RATE: 106 BPM | WEIGHT: 233 LBS | BODY MASS INDEX: 39 KG/M2 | DIASTOLIC BLOOD PRESSURE: 70 MMHG

## 2022-05-16 DIAGNOSIS — R56.9 SEIZURE (HCC): Primary | ICD-10-CM

## 2022-05-16 PROCEDURE — 3074F SYST BP LT 130 MM HG: CPT | Performed by: HOSPITALIST

## 2022-05-16 PROCEDURE — 99204 OFFICE O/P NEW MOD 45 MIN: CPT | Performed by: HOSPITALIST

## 2022-05-16 PROCEDURE — 3078F DIAST BP <80 MM HG: CPT | Performed by: HOSPITALIST

## 2022-05-16 RX ORDER — DESVENLAFAXINE 25 MG/1
25 TABLET, EXTENDED RELEASE ORAL EVERY MORNING
COMMUNITY
Start: 2022-05-12

## 2022-05-16 NOTE — PROGRESS NOTES
Patient states seizure around March or April 2022. Patient denies recent seizures. Denies changes to memory, speech or balance. Denies facial numbness or tingling.

## 2022-06-20 ENCOUNTER — NURSE ONLY (OUTPATIENT)
Dept: ELECTROPHYSIOLOGY | Facility: HOSPITAL | Age: 27
End: 2022-06-20
Attending: HOSPITALIST
Payer: COMMERCIAL

## 2022-06-20 ENCOUNTER — LAB ENCOUNTER (OUTPATIENT)
Dept: LAB | Age: 27
End: 2022-06-20
Attending: INTERNAL MEDICINE
Payer: COMMERCIAL

## 2022-06-20 ENCOUNTER — LAB ENCOUNTER (OUTPATIENT)
Dept: LAB | Age: 27
End: 2022-06-20
Attending: FAMILY MEDICINE
Payer: COMMERCIAL

## 2022-06-20 DIAGNOSIS — E06.3 CHRONIC LYMPHOCYTIC THYROIDITIS: ICD-10-CM

## 2022-06-20 DIAGNOSIS — R56.9 SEIZURE (HCC): ICD-10-CM

## 2022-06-20 DIAGNOSIS — Z79.890 NEED FOR PROPHYLACTIC HORMONE REPLACEMENT THERAPY (POSTMENOPAUSAL): ICD-10-CM

## 2022-06-20 DIAGNOSIS — E03.8 HYPOTHYROIDISM, SECONDARY: ICD-10-CM

## 2022-06-20 DIAGNOSIS — R63.5 ABNORMAL WEIGHT GAIN: ICD-10-CM

## 2022-06-20 DIAGNOSIS — R61 EXCESSIVE SWEATING: ICD-10-CM

## 2022-06-20 DIAGNOSIS — E34.9 ENDOCRINE DISORDER: Primary | ICD-10-CM

## 2022-06-20 DIAGNOSIS — R13.10 PROBLEMS WITH SWALLOWING AND MASTICATION: ICD-10-CM

## 2022-06-20 DIAGNOSIS — R94.5 NONSPECIFIC ABNORMAL RESULTS OF LIVER FUNCTION STUDY: ICD-10-CM

## 2022-06-20 DIAGNOSIS — E55.9 AVITAMINOSIS D: ICD-10-CM

## 2022-06-20 DIAGNOSIS — E78.5 HYPERLIPEMIA: ICD-10-CM

## 2022-06-20 LAB
ALBUMIN SERPL-MCNC: 4 G/DL (ref 3.4–5)
ALBUMIN/GLOB SERPL: 1.1 {RATIO} (ref 1–2)
ALP LIVER SERPL-CCNC: 82 U/L
ALT SERPL-CCNC: 16 U/L
ANION GAP SERPL CALC-SCNC: 4 MMOL/L (ref 0–18)
AST SERPL-CCNC: 18 U/L (ref 15–37)
BASOPHILS # BLD AUTO: 0.01 X10(3) UL (ref 0–0.2)
BASOPHILS NFR BLD AUTO: 0.1 %
BILIRUB SERPL-MCNC: 0.5 MG/DL (ref 0.1–2)
BUN BLD-MCNC: 7 MG/DL (ref 7–18)
CALCIUM BLD-MCNC: 9.9 MG/DL (ref 8.5–10.1)
CHLORIDE SERPL-SCNC: 104 MMOL/L (ref 98–112)
CHOLEST SERPL-MCNC: 211 MG/DL (ref ?–200)
CO2 SERPL-SCNC: 30 MMOL/L (ref 21–32)
CREAT BLD-MCNC: 1.26 MG/DL
EOSINOPHIL # BLD AUTO: 0.03 X10(3) UL (ref 0–0.7)
EOSINOPHIL NFR BLD AUTO: 0.4 %
ERYTHROCYTE [DISTWIDTH] IN BLOOD BY AUTOMATED COUNT: 12.8 %
EST. AVERAGE GLUCOSE BLD GHB EST-MCNC: 94 MG/DL (ref 68–126)
ESTRADIOL SERPL-MCNC: 55.3 PG/ML
FASTING PATIENT LIPID ANSWER: YES
FASTING STATUS PATIENT QL REPORTED: YES
GLOBULIN PLAS-MCNC: 3.7 G/DL (ref 2.8–4.4)
GLUCOSE BLD-MCNC: 105 MG/DL (ref 70–99)
HBA1C MFR BLD: 4.9 % (ref ?–5.7)
HCT VFR BLD AUTO: 47.3 %
HDLC SERPL-MCNC: 48 MG/DL (ref 40–59)
HGB BLD-MCNC: 15.8 G/DL
IMM GRANULOCYTES # BLD AUTO: 0.02 X10(3) UL (ref 0–1)
IMM GRANULOCYTES NFR BLD: 0.3 %
LDLC SERPL CALC-MCNC: 136 MG/DL (ref ?–100)
LYMPHOCYTES # BLD AUTO: 1.93 X10(3) UL (ref 1–4)
LYMPHOCYTES NFR BLD AUTO: 26.8 %
MCH RBC QN AUTO: 30.2 PG (ref 26–34)
MCHC RBC AUTO-ENTMCNC: 33.4 G/DL (ref 31–37)
MCV RBC AUTO: 90.3 FL
MONOCYTES # BLD AUTO: 0.49 X10(3) UL (ref 0.1–1)
MONOCYTES NFR BLD AUTO: 6.8 %
NEUTROPHILS # BLD AUTO: 4.73 X10 (3) UL (ref 1.5–7.7)
NEUTROPHILS # BLD AUTO: 4.73 X10(3) UL (ref 1.5–7.7)
NEUTROPHILS NFR BLD AUTO: 65.6 %
NONHDLC SERPL-MCNC: 163 MG/DL (ref ?–130)
OSMOLALITY SERPL CALC.SUM OF ELEC: 284 MOSM/KG (ref 275–295)
PLATELET # BLD AUTO: 193 10(3)UL (ref 150–450)
POTASSIUM SERPL-SCNC: 4.6 MMOL/L (ref 3.5–5.1)
PROT SERPL-MCNC: 7.7 G/DL (ref 6.4–8.2)
RBC # BLD AUTO: 5.24 X10(6)UL
SODIUM SERPL-SCNC: 138 MMOL/L (ref 136–145)
T4 FREE SERPL-MCNC: 1 NG/DL (ref 0.8–1.7)
TESTOST SERPL-MCNC: 693.66 NG/DL
TRIGL SERPL-MCNC: 151 MG/DL (ref 30–149)
TSI SER-ACNC: 1.08 MIU/ML (ref 0.36–3.74)
VIT D+METAB SERPL-MCNC: 27.5 NG/ML (ref 30–100)
VLDLC SERPL CALC-MCNC: 28 MG/DL (ref 0–30)
WBC # BLD AUTO: 7.2 X10(3) UL (ref 4–11)

## 2022-06-20 PROCEDURE — 84443 ASSAY THYROID STIM HORMONE: CPT

## 2022-06-20 PROCEDURE — 36415 COLL VENOUS BLD VENIPUNCTURE: CPT

## 2022-06-20 PROCEDURE — 80053 COMPREHEN METABOLIC PANEL: CPT

## 2022-06-20 PROCEDURE — 85025 COMPLETE CBC W/AUTO DIFF WBC: CPT

## 2022-06-20 PROCEDURE — 95816 EEG AWAKE AND DROWSY: CPT | Performed by: HOSPITALIST

## 2022-06-20 PROCEDURE — 84403 ASSAY OF TOTAL TESTOSTERONE: CPT

## 2022-06-20 PROCEDURE — 83036 HEMOGLOBIN GLYCOSYLATED A1C: CPT

## 2022-06-20 PROCEDURE — 84439 ASSAY OF FREE THYROXINE: CPT

## 2022-06-20 PROCEDURE — 82670 ASSAY OF TOTAL ESTRADIOL: CPT

## 2022-06-20 PROCEDURE — 80061 LIPID PANEL: CPT

## 2022-06-20 PROCEDURE — 82306 VITAMIN D 25 HYDROXY: CPT

## 2022-06-23 ENCOUNTER — OFFICE VISIT (OUTPATIENT)
Dept: NEUROLOGY | Facility: CLINIC | Age: 27
End: 2022-06-23
Payer: COMMERCIAL

## 2022-06-23 VITALS
SYSTOLIC BLOOD PRESSURE: 124 MMHG | RESPIRATION RATE: 16 BRPM | WEIGHT: 233 LBS | HEIGHT: 65 IN | BODY MASS INDEX: 38.82 KG/M2 | DIASTOLIC BLOOD PRESSURE: 88 MMHG | HEART RATE: 114 BPM

## 2022-06-23 DIAGNOSIS — R56.9 SEIZURE (HCC): Primary | ICD-10-CM

## 2022-06-23 PROCEDURE — 99213 OFFICE O/P EST LOW 20 MIN: CPT | Performed by: HOSPITALIST

## 2022-06-23 PROCEDURE — 3074F SYST BP LT 130 MM HG: CPT | Performed by: HOSPITALIST

## 2022-06-23 PROCEDURE — 3008F BODY MASS INDEX DOCD: CPT | Performed by: HOSPITALIST

## 2022-06-23 PROCEDURE — 3079F DIAST BP 80-89 MM HG: CPT | Performed by: HOSPITALIST

## 2022-06-23 RX ORDER — DESVENLAFAXINE 100 MG/1
100 TABLET, EXTENDED RELEASE ORAL EVERY MORNING
COMMUNITY
Start: 2022-06-09

## 2022-07-06 DIAGNOSIS — E06.3 AUTOIMMUNE THYROIDITIS: Primary | ICD-10-CM

## 2022-07-06 RX ORDER — LEVOTHYROXINE SODIUM 88 UG/1
TABLET ORAL
Qty: 18 TABLET | Refills: 0 | Status: SHIPPED | OUTPATIENT
Start: 2022-07-06 | End: 2022-08-01

## 2022-07-06 RX ORDER — LEVOTHYROXINE SODIUM 0.1 MG/1
100 TABLET ORAL
Qty: 36 TABLET | Refills: 1 | Status: SHIPPED | OUTPATIENT
Start: 2022-07-06 | End: 2022-10-04

## 2022-07-13 NOTE — PROCEDURES
[facility] - ELECTROENCEPHALOGRAM (EEG) REPORT  Patient Name:  Jess Velasco   MRN / CSN:  [id number] / [id number]   Date of Birth / Age:  1/20/1995 /  32year old   Encounter Date:  6/20/2022         METHODS:  Twenty-two electrodes were applied according to the 10-20-electrode placement system on this routine audio-video EEG. EKG monitoring, monopolar and bipolar montages are routinely utilized. The record was obtained on a digital system. OBJECT:  This is a 32year old year-old male with a spells of altered sensorium     The EEG was requested to assess for epileptiform activity and change in mental status. State(s) of consciousness: awake and drowsy    Relevant medications:   amphetamine-dextroamphetamine 20 MG Oral Tab, Take 20 mg by mouth 2 (two) times daily. , Disp: , Rfl:   hydrocortisone 2.5 % External Ointment, APPLY TO AFFECTED AREA(S)  ON CHIN TWICE A DAY FOR THREE DAYS, Disp: , Rfl:   VENTOLIN  (90 Base) MCG/ACT Inhalation Aero Soln, INHALE 2 PUFFS ORALLY EVERY FOUR HOURS AS NEEDED FOR WHEEZING, Disp: 18 g, Rfl: 0  PREVIDENT 5000 BOOSTER PLUS 1.1 % Dental Paste, BRUSH TEETH TWICE DAILY AND SPIT, DO NOT RINSE OR DRINK 1 HOUR AFTER USE., Disp: , Rfl:   VRAYLAR 3 MG Oral Cap, Take 1 capsule by mouth daily. , Disp: , Rfl:   buPROPion  MG Oral Tablet 24 Hr, Take 3 tablets (450 mg total) by mouth daily. , Disp: , Rfl: 0  haloperidol 2 MG Oral Tab, Take 1 tablet (2 mg total) by mouth 2 (two) times daily. , Disp: , Rfl: 0  prazosin 2 MG Oral Cap, Take 4 capsules (8 mg total) by mouth nightly., Disp: , Rfl: 0  MONTELUKAST 10 MG Oral Tab, TAKE 1 TABLET BY MOUTH EVERY DAY, Disp: 90 tablet, Rfl: 0  ALBUTEROL SULFATE (2.5 MG/3ML) 0.083% Inhalation Nebu Soln, USE 1 AMPULE IN NEBULIZER EVERY SIX HOURS AS NEEDED FOR WHEEZING, Disp: 300 mL, Rfl: 0  Benztropine Mesylate 0.5 MG Oral Tab, Take 1 tablet (0.5 mg total) by mouth daily. , Disp: , Rfl: 0  BD INTEGRA SYRINGE 25G X 1\" 3 ML Does not apply Misc, USE TO INJECT MEDICATION ONCE WEEKLY, Disp: , Rfl: 3  Ketoconazole 2 % External Shampoo, use to wash AFFECTED AREAS OF SCALP, BEHIND EARS, AND EYEBROWS DAILY, Disp: , Rfl: 3  temazepam 15 MG Oral Cap, Take 45 mg by mouth daily. , Disp: , Rfl:   Naltrexone HCl 50 MG Oral Tab, Take 50 mg by mouth 2 (two) times daily. , Disp: , Rfl:   Levothyroxine Sodium 100 MCG Oral Tab, Take one tablet daily on Tuesday, Thursday and Saturday before breakfast., Disp: 45 tablet, Rfl: 0  Testosterone cypionate 200 MG/ML Intramuscular Solution, INJ 0.5 mg every 7 days, Disp: , Rfl: 3  Levothyroxine Sodium 88 MCG Oral Tab, Take 1 tablet before breakfast on Sunday, Monday, Wednesday, and Friday, Disp: 64 tablet, Rfl: 0  finasteride 5 MG Oral Tab, Take 5 mg by mouth daily. , Disp: , Rfl:   ibuprofen (MOTRIN) 600 MG Oral Tab, Take 600 mg by mouth every 6 (six) hours as needed for Pain., Disp: , Rfl:     No current facility-administered medications on file prior to visit. FINDINGS:  During maximal wakefulness, there was a bilateral posterior dominant rhythm between 9.5 to 10.5 Hz (15-35 uV ) that appeared symmetric and reactive. General background  was organazied and largely consisted of alpha and beta activity. No clear ictal or interictal discharges appreciated. No clinical events documented for review that would be consistent with a seizure . Background was reactive throughout the recording. IMPRESSION:  This was an unrevealing routine EEG in the awake and drowsy states. No clear seizures or seizure tendency captured. Of note, a routine EEG cannot fully exclude the possibility for epilepsy.        SIGNATURES:  Louie Garcia MD   [facility] Neurology

## 2022-07-15 ENCOUNTER — TELEMEDICINE (OUTPATIENT)
Dept: INTERNAL MEDICINE CLINIC | Facility: CLINIC | Age: 27
End: 2022-07-15
Payer: COMMERCIAL

## 2022-07-15 DIAGNOSIS — E03.8 HYPOTHYROIDISM, SECONDARY: Primary | ICD-10-CM

## 2022-07-15 DIAGNOSIS — F64.9 GENDER DYSPHORIA: ICD-10-CM

## 2022-07-15 DIAGNOSIS — F31.9 BIPOLAR 1 DISORDER (HCC): ICD-10-CM

## 2022-07-15 RX ORDER — BUPROPION HYDROCHLORIDE 300 MG/1
300 TABLET ORAL EVERY MORNING
COMMUNITY
Start: 2022-07-01

## 2022-07-15 RX ORDER — DESVENLAFAXINE 25 MG/1
1 TABLET, EXTENDED RELEASE ORAL DAILY
COMMUNITY
Start: 2022-06-30

## 2022-07-15 NOTE — PROGRESS NOTES
Due to COVID-19 ACTION PLAN, the patient's office visit was converted to a phone and video visit with informed patient consent. Time Spent:30 min    Subjective     HPI:   Summer Aguilar is a 32year old male who presents for f/u of biplar and constipationa nd hypothyroid. Labs are stable, look good upon review. Feeling better with improved mood, more motivated, not missing work, takingmeds as prescribed. Constipation has not been an issue. No Further Nursing Notes to Review  Problem List Reviewed            REVIEW OF SYSTEMS:  No f/c/chest pain or sob. No cough. No n/v/d. No ha or dizziness. No numbness, tingling, or weakness. No other complaints today. Physical Exam:  alert, appears stated age and cooperative and Speaking in full sentences comfortably        Assessment    Diagnoses and all orders for this visit:    Hypothyroidism, secondary  -     ENDOCRINOLOGY - INTERNAL    Gender dysphoria    Bipolar 1 disorder ([facility] [address]       Reinforced healthy diet, lifestyle, and exercise. Stable continue meds and f/u with psych  See me in 4-6 mos  No follow-ups on file. Aristides Silva MD    Summer Aguilar understands phone evaluation is not a substitute for face-to-face examination or emergency care. Patient advised to go to ER or call 911 for worsening symptoms or acute distress. Please note that the following visit was completed using two-way, real-time interactive audio and video communication. This has been done in good corrine to provide continuity of care in the best interest of the provider-patient relationship, due to the on-going public health crisis/national emergency and because of restrictions of visitation. There are limitations of this visit as no physical exam could be performed. Every conscious effort was taken to allow for sufficient and adequate time. This billing visit was spent on reviewing labs, medications, radiology tests and decision making.   Appropriate medical decision-making and tests are ordered as detailed in the plan of care above.

## 2022-07-31 DIAGNOSIS — E06.3 AUTOIMMUNE THYROIDITIS: ICD-10-CM

## 2022-08-01 RX ORDER — LEVOTHYROXINE SODIUM 88 UG/1
TABLET ORAL
Qty: 18 TABLET | Refills: 0 | Status: SHIPPED | OUTPATIENT
Start: 2022-08-01 | End: 2022-10-10

## 2022-08-01 RX ORDER — MONTELUKAST SODIUM 10 MG/1
TABLET ORAL
Qty: 90 TABLET | Refills: 2 | Status: SHIPPED | OUTPATIENT
Start: 2022-08-01

## 2022-08-08 PROBLEM — M75.81 TENDINITIS OF RIGHT ROTATOR CUFF: Status: ACTIVE | Noted: 2022-07-01

## 2022-08-08 PROBLEM — M75.21 BICEPS TENDINITIS OF RIGHT UPPER EXTREMITY: Status: ACTIVE | Noted: 2022-07-01

## 2022-08-22 ENCOUNTER — APPOINTMENT (OUTPATIENT)
Dept: PEDIATRIC ENDOCRINOLOGY | Age: 27
End: 2022-08-22

## 2022-10-06 DIAGNOSIS — E06.3 AUTOIMMUNE THYROIDITIS: ICD-10-CM

## 2022-10-10 RX ORDER — LEVOTHYROXINE SODIUM 88 UG/1
TABLET ORAL
Qty: 18 TABLET | Refills: 3 | Status: SHIPPED | OUTPATIENT
Start: 2022-10-10

## 2022-10-19 ENCOUNTER — OFFICE VISIT (OUTPATIENT)
Dept: INTERNAL MEDICINE CLINIC | Facility: CLINIC | Age: 27
End: 2022-10-19
Payer: COMMERCIAL

## 2022-10-19 VITALS
RESPIRATION RATE: 18 BRPM | WEIGHT: 222 LBS | HEIGHT: 65 IN | OXYGEN SATURATION: 98 % | DIASTOLIC BLOOD PRESSURE: 64 MMHG | SYSTOLIC BLOOD PRESSURE: 120 MMHG | BODY MASS INDEX: 36.99 KG/M2 | HEART RATE: 117 BPM

## 2022-10-19 DIAGNOSIS — M54.50 ACUTE BILATERAL LOW BACK PAIN WITHOUT SCIATICA: Primary | ICD-10-CM

## 2022-10-19 PROCEDURE — 3078F DIAST BP <80 MM HG: CPT | Performed by: PHYSICIAN ASSISTANT

## 2022-10-19 PROCEDURE — 99213 OFFICE O/P EST LOW 20 MIN: CPT | Performed by: PHYSICIAN ASSISTANT

## 2022-10-19 PROCEDURE — 3074F SYST BP LT 130 MM HG: CPT | Performed by: PHYSICIAN ASSISTANT

## 2022-10-19 PROCEDURE — 3008F BODY MASS INDEX DOCD: CPT | Performed by: PHYSICIAN ASSISTANT

## 2022-10-19 RX ORDER — CYCLOBENZAPRINE HCL 10 MG
10 TABLET ORAL 3 TIMES DAILY
Qty: 21 TABLET | Refills: 0 | Status: SHIPPED | OUTPATIENT
Start: 2022-10-19 | End: 2022-11-08

## 2022-10-19 NOTE — PATIENT INSTRUCTIONS
Ice or heat. Gentle stretching. Aleve (over the counter) 2 pills by mouth twice a day with food for 7 days.

## 2022-10-31 ENCOUNTER — TELEPHONE (OUTPATIENT)
Dept: INTERNAL MEDICINE CLINIC | Facility: CLINIC | Age: 27
End: 2022-10-31

## 2022-10-31 NOTE — TELEPHONE ENCOUNTER
Pt would like to know if AS will do a VV with him on   Future Appointments   Date Time Provider Nika Avalos   11/4/2022 11:00 AM Lex Anderson MD [facility]      he is not able to leave work to be here on time.

## 2022-11-02 NOTE — TELEPHONE ENCOUNTER
Pt called and wants to keep visit in office and not virtual, just [facility]

## 2022-11-04 ENCOUNTER — OFFICE VISIT (OUTPATIENT)
Dept: INTERNAL MEDICINE CLINIC | Facility: CLINIC | Age: 27
End: 2022-11-04
Payer: COMMERCIAL

## 2022-11-04 VITALS
HEIGHT: 65 IN | WEIGHT: 224 LBS | BODY MASS INDEX: 37.32 KG/M2 | SYSTOLIC BLOOD PRESSURE: 102 MMHG | HEART RATE: 89 BPM | RESPIRATION RATE: 18 BRPM | OXYGEN SATURATION: 98 % | DIASTOLIC BLOOD PRESSURE: 64 MMHG | TEMPERATURE: 97 F

## 2022-11-04 DIAGNOSIS — F64.9 GENDER DYSPHORIA: ICD-10-CM

## 2022-11-04 DIAGNOSIS — E03.8 HYPOTHYROIDISM, SECONDARY: Primary | ICD-10-CM

## 2022-11-04 DIAGNOSIS — E66.09 CLASS 2 OBESITY DUE TO EXCESS CALORIES WITHOUT SERIOUS COMORBIDITY WITH BODY MASS INDEX (BMI) OF 37.0 TO 37.9 IN ADULT: ICD-10-CM

## 2022-11-04 DIAGNOSIS — F90.9 ATTENTION DEFICIT HYPERACTIVITY DISORDER (ADHD), UNSPECIFIED ADHD TYPE: ICD-10-CM

## 2022-11-04 PROCEDURE — 3008F BODY MASS INDEX DOCD: CPT | Performed by: INTERNAL MEDICINE

## 2022-11-04 PROCEDURE — 99214 OFFICE O/P EST MOD 30 MIN: CPT | Performed by: INTERNAL MEDICINE

## 2022-11-04 PROCEDURE — 3078F DIAST BP <80 MM HG: CPT | Performed by: INTERNAL MEDICINE

## 2022-11-04 PROCEDURE — 3074F SYST BP LT 130 MM HG: CPT | Performed by: INTERNAL MEDICINE

## 2022-11-04 RX ORDER — LISDEXAMFETAMINE DIMESYLATE 50 MG
50 CAPSULE ORAL EVERY MORNING
COMMUNITY
Start: 2022-10-17

## 2022-11-08 ENCOUNTER — OFFICE VISIT (OUTPATIENT)
Dept: ENDOCRINOLOGY CLINIC | Facility: CLINIC | Age: 27
End: 2022-11-08
Payer: COMMERCIAL

## 2022-11-08 ENCOUNTER — TELEPHONE (OUTPATIENT)
Dept: ENDOCRINOLOGY CLINIC | Facility: CLINIC | Age: 27
End: 2022-11-08

## 2022-11-08 ENCOUNTER — TELEPHONE (OUTPATIENT)
Dept: INTERNAL MEDICINE CLINIC | Facility: CLINIC | Age: 27
End: 2022-11-08

## 2022-11-08 VITALS — DIASTOLIC BLOOD PRESSURE: 90 MMHG | SYSTOLIC BLOOD PRESSURE: 118 MMHG | HEART RATE: 78 BPM

## 2022-11-08 DIAGNOSIS — Z78.9 FEMALE-TO-MALE TRANSGENDER PERSON: ICD-10-CM

## 2022-11-08 DIAGNOSIS — E03.9 HYPOTHYROIDISM (ACQUIRED): Primary | ICD-10-CM

## 2022-11-08 PROCEDURE — 3074F SYST BP LT 130 MM HG: CPT | Performed by: STUDENT IN AN ORGANIZED HEALTH CARE EDUCATION/TRAINING PROGRAM

## 2022-11-08 PROCEDURE — 99205 OFFICE O/P NEW HI 60 MIN: CPT | Performed by: STUDENT IN AN ORGANIZED HEALTH CARE EDUCATION/TRAINING PROGRAM

## 2022-11-08 PROCEDURE — 3080F DIAST BP >= 90 MM HG: CPT | Performed by: STUDENT IN AN ORGANIZED HEALTH CARE EDUCATION/TRAINING PROGRAM

## 2022-11-08 RX ORDER — LEVOTHYROXINE SODIUM 88 UG/1
TABLET ORAL
Qty: 16 TABLET | Refills: 5 | Status: SHIPPED | OUTPATIENT
Start: 2022-11-08

## 2022-11-08 RX ORDER — LEVOTHYROXINE SODIUM 0.1 MG/1
TABLET ORAL
Qty: 12 TABLET | Refills: 5 | Status: SHIPPED | OUTPATIENT
Start: 2022-11-08

## 2022-11-08 NOTE — TELEPHONE ENCOUNTER
Sent completed medical records request for patient to [facility]/now [facility]. Faxed to [phone]. Requesting medical records dated [phone] for last two office visit notes.

## 2022-11-23 ENCOUNTER — TELEPHONE (OUTPATIENT)
Dept: INTERNAL MEDICINE CLINIC | Facility: CLINIC | Age: 27
End: 2022-11-23

## 2022-12-27 ENCOUNTER — LAB ENCOUNTER (OUTPATIENT)
Dept: LAB | Age: 27
End: 2022-12-27
Attending: INTERNAL MEDICINE
Payer: COMMERCIAL

## 2022-12-27 ENCOUNTER — LAB ENCOUNTER (OUTPATIENT)
Dept: LAB | Age: 27
End: 2022-12-27
Attending: FAMILY MEDICINE
Payer: COMMERCIAL

## 2022-12-27 DIAGNOSIS — E03.9 HYPOTHYROIDISM: Primary | ICD-10-CM

## 2022-12-27 DIAGNOSIS — E66.09 CLASS 2 OBESITY DUE TO EXCESS CALORIES WITHOUT SERIOUS COMORBIDITY WITH BODY MASS INDEX (BMI) OF 37.0 TO 37.9 IN ADULT: ICD-10-CM

## 2022-12-27 DIAGNOSIS — E03.9 HYPOTHYROIDISM (ACQUIRED): ICD-10-CM

## 2022-12-27 DIAGNOSIS — F32.A DEPRESSION: ICD-10-CM

## 2022-12-27 DIAGNOSIS — F64.9 GENDER DYSPHORIA: ICD-10-CM

## 2022-12-27 DIAGNOSIS — E34.9 ENDOCRINE DISORDER: ICD-10-CM

## 2022-12-27 DIAGNOSIS — E03.8 HYPOTHYROIDISM, SECONDARY: ICD-10-CM

## 2022-12-27 LAB
ALBUMIN SERPL-MCNC: 4 G/DL (ref 3.4–5)
ALBUMIN/GLOB SERPL: 1.1 {RATIO} (ref 1–2)
ALP LIVER SERPL-CCNC: 79 U/L
ALT SERPL-CCNC: 18 U/L
ANION GAP SERPL CALC-SCNC: 3 MMOL/L (ref 0–18)
AST SERPL-CCNC: 24 U/L (ref 15–37)
BASOPHILS # BLD AUTO: 0.02 X10(3) UL (ref 0–0.2)
BASOPHILS NFR BLD AUTO: 0.3 %
BILIRUB SERPL-MCNC: 0.6 MG/DL (ref 0.1–2)
BUN BLD-MCNC: 11 MG/DL (ref 7–18)
CALCIUM BLD-MCNC: 9.5 MG/DL (ref 8.5–10.1)
CHLORIDE SERPL-SCNC: 105 MMOL/L (ref 98–112)
CHOLEST SERPL-MCNC: 201 MG/DL (ref ?–200)
CO2 SERPL-SCNC: 31 MMOL/L (ref 21–32)
CREAT BLD-MCNC: 1.28 MG/DL
EOSINOPHIL # BLD AUTO: 0.08 X10(3) UL (ref 0–0.7)
EOSINOPHIL NFR BLD AUTO: 1.2 %
ERYTHROCYTE [DISTWIDTH] IN BLOOD BY AUTOMATED COUNT: 12.2 %
EST. AVERAGE GLUCOSE BLD GHB EST-MCNC: 100 MG/DL (ref 68–126)
ESTRADIOL SERPL-MCNC: 27.1 PG/ML
FASTING PATIENT LIPID ANSWER: YES
FASTING STATUS PATIENT QL REPORTED: YES
GFR SERPLBLD BASED ON 1.73 SQ M-ARVRAT: 78 ML/MIN/1.73M2 (ref 60–?)
GLOBULIN PLAS-MCNC: 3.6 G/DL (ref 2.8–4.4)
GLUCOSE BLD-MCNC: 115 MG/DL (ref 70–99)
HBA1C MFR BLD: 5.1 % (ref ?–5.7)
HCT VFR BLD AUTO: 48.4 %
HDLC SERPL-MCNC: 42 MG/DL (ref 40–59)
HGB BLD-MCNC: 15.9 G/DL
IMM GRANULOCYTES # BLD AUTO: 0.01 X10(3) UL (ref 0–1)
IMM GRANULOCYTES NFR BLD: 0.1 %
LDLC SERPL CALC-MCNC: 129 MG/DL (ref ?–100)
LYMPHOCYTES # BLD AUTO: 1.64 X10(3) UL (ref 1–4)
LYMPHOCYTES NFR BLD AUTO: 24 %
MCH RBC QN AUTO: 29 PG (ref 26–34)
MCHC RBC AUTO-ENTMCNC: 32.9 G/DL (ref 31–37)
MCV RBC AUTO: 88.3 FL
MONOCYTES # BLD AUTO: 0.39 X10(3) UL (ref 0.1–1)
MONOCYTES NFR BLD AUTO: 5.7 %
NEUTROPHILS # BLD AUTO: 4.69 X10 (3) UL (ref 1.5–7.7)
NEUTROPHILS # BLD AUTO: 4.69 X10(3) UL (ref 1.5–7.7)
NEUTROPHILS NFR BLD AUTO: 68.7 %
NONHDLC SERPL-MCNC: 159 MG/DL (ref ?–130)
OSMOLALITY SERPL CALC.SUM OF ELEC: 288 MOSM/KG (ref 275–295)
PLATELET # BLD AUTO: 216 10(3)UL (ref 150–450)
POTASSIUM SERPL-SCNC: 4.3 MMOL/L (ref 3.5–5.1)
PROT SERPL-MCNC: 7.6 G/DL (ref 6.4–8.2)
RBC # BLD AUTO: 5.48 X10(6)UL
SODIUM SERPL-SCNC: 139 MMOL/L (ref 136–145)
T4 FREE SERPL-MCNC: 1.1 NG/DL (ref 0.8–1.7)
TESTOST SERPL-MCNC: 199.36 NG/DL
THYROGLOB SERPL-MCNC: 114 U/ML (ref ?–60)
THYROPEROXIDASE AB SERPL-ACNC: 50 U/ML (ref ?–60)
TRIGL SERPL-MCNC: 166 MG/DL (ref 30–149)
TSI SER-ACNC: 0.21 MIU/ML (ref 0.36–3.74)
VIT D+METAB SERPL-MCNC: 24.1 NG/ML (ref 30–100)
VLDLC SERPL CALC-MCNC: 30 MG/DL (ref 0–30)
WBC # BLD AUTO: 6.8 X10(3) UL (ref 4–11)

## 2022-12-27 PROCEDURE — 80053 COMPREHEN METABOLIC PANEL: CPT

## 2022-12-27 PROCEDURE — 84403 ASSAY OF TOTAL TESTOSTERONE: CPT

## 2022-12-27 PROCEDURE — 86376 MICROSOMAL ANTIBODY EACH: CPT

## 2022-12-27 PROCEDURE — 85025 COMPLETE CBC W/AUTO DIFF WBC: CPT

## 2022-12-27 PROCEDURE — 36415 COLL VENOUS BLD VENIPUNCTURE: CPT

## 2022-12-27 PROCEDURE — 82670 ASSAY OF TOTAL ESTRADIOL: CPT

## 2022-12-27 PROCEDURE — 84402 ASSAY OF FREE TESTOSTERONE: CPT

## 2022-12-27 PROCEDURE — 86800 THYROGLOBULIN ANTIBODY: CPT

## 2022-12-27 PROCEDURE — 83036 HEMOGLOBIN GLYCOSYLATED A1C: CPT

## 2022-12-27 PROCEDURE — 84439 ASSAY OF FREE THYROXINE: CPT

## 2022-12-27 PROCEDURE — 82306 VITAMIN D 25 HYDROXY: CPT

## 2022-12-27 PROCEDURE — 80061 LIPID PANEL: CPT

## 2022-12-27 PROCEDURE — 84443 ASSAY THYROID STIM HORMONE: CPT

## 2022-12-31 LAB
TESTOSTERONE TOTAL: 284.8 NG/DL
TESTOSTERONE, FREE -MS/MS: 45.1 PG/ML

## 2023-01-19 ENCOUNTER — DOCUMENTATION ONLY (OUTPATIENT)
Dept: ENDOCRINOLOGY CLINIC | Facility: CLINIC | Age: 28
End: 2023-01-19

## 2023-01-19 NOTE — PROGRESS NOTES
Received outside records from [facility] (Dr Britany Tripp) who had been managing pt's hypothyroidism only.    LV May 2022 - A&P was to continue LT4 88mcg (4 days a week) / LT 100mcg (3 days a week); they were aware that pt receives his transgender care through Dr Marsha [facility] at [facility]

## 2023-01-23 ENCOUNTER — MED REC SCAN ONLY (OUTPATIENT)
Dept: ENDOCRINOLOGY CLINIC | Facility: CLINIC | Age: 28
End: 2023-01-23

## 2023-03-31 ENCOUNTER — LAB ENCOUNTER (OUTPATIENT)
Dept: LAB | Age: 28
End: 2023-03-31
Attending: INTERNAL MEDICINE
Payer: COMMERCIAL

## 2023-03-31 ENCOUNTER — OFFICE VISIT (OUTPATIENT)
Dept: INTERNAL MEDICINE CLINIC | Facility: CLINIC | Age: 28
End: 2023-03-31
Payer: COMMERCIAL

## 2023-03-31 VITALS
SYSTOLIC BLOOD PRESSURE: 108 MMHG | TEMPERATURE: 97 F | BODY MASS INDEX: 35.23 KG/M2 | HEART RATE: 80 BPM | WEIGHT: 219.19 LBS | HEIGHT: 66 IN | DIASTOLIC BLOOD PRESSURE: 64 MMHG | RESPIRATION RATE: 18 BRPM | OXYGEN SATURATION: 99 %

## 2023-03-31 DIAGNOSIS — E55.9 VITAMIN D DEFICIENCY: ICD-10-CM

## 2023-03-31 DIAGNOSIS — R53.83 OTHER FATIGUE: ICD-10-CM

## 2023-03-31 DIAGNOSIS — R79.89 LOW TESTOSTERONE: ICD-10-CM

## 2023-03-31 DIAGNOSIS — E03.8 HYPOTHYROIDISM, SECONDARY: ICD-10-CM

## 2023-03-31 DIAGNOSIS — F31.9 BIPOLAR 1 DISORDER (HCC): ICD-10-CM

## 2023-03-31 DIAGNOSIS — F33.2 SEVERE EPISODE OF RECURRENT MAJOR DEPRESSIVE DISORDER, WITHOUT PSYCHOTIC FEATURES (HCC): ICD-10-CM

## 2023-03-31 DIAGNOSIS — J45.20 MILD INTERMITTENT EXTRINSIC ASTHMA WITHOUT COMPLICATION: Primary | ICD-10-CM

## 2023-03-31 DIAGNOSIS — J45.20 MILD INTERMITTENT EXTRINSIC ASTHMA WITHOUT COMPLICATION: ICD-10-CM

## 2023-03-31 LAB
ALBUMIN SERPL-MCNC: 3.9 G/DL (ref 3.4–5)
ALBUMIN/GLOB SERPL: 1.2 {RATIO} (ref 1–2)
ALP LIVER SERPL-CCNC: 93 U/L
ALT SERPL-CCNC: 19 U/L
ANION GAP SERPL CALC-SCNC: 6 MMOL/L (ref 0–18)
AST SERPL-CCNC: 21 U/L (ref 15–37)
BILIRUB SERPL-MCNC: 0.5 MG/DL (ref 0.1–2)
BUN BLD-MCNC: 6 MG/DL (ref 7–18)
CALCIUM BLD-MCNC: 9.2 MG/DL (ref 8.5–10.1)
CHLORIDE SERPL-SCNC: 104 MMOL/L (ref 98–112)
CO2 SERPL-SCNC: 27 MMOL/L (ref 21–32)
CREAT BLD-MCNC: 1.05 MG/DL
ESTRADIOL SERPL-MCNC: 32.3 PG/ML
FASTING STATUS PATIENT QL REPORTED: NO
GFR SERPLBLD BASED ON 1.73 SQ M-ARVRAT: 98 ML/MIN/1.73M2 (ref 60–?)
GLOBULIN PLAS-MCNC: 3.3 G/DL (ref 2.8–4.4)
GLUCOSE BLD-MCNC: 88 MG/DL (ref 70–99)
OSMOLALITY SERPL CALC.SUM OF ELEC: 281 MOSM/KG (ref 275–295)
POTASSIUM SERPL-SCNC: 3.9 MMOL/L (ref 3.5–5.1)
PROT SERPL-MCNC: 7.2 G/DL (ref 6.4–8.2)
SODIUM SERPL-SCNC: 137 MMOL/L (ref 136–145)
T4 FREE SERPL-MCNC: 1.1 NG/DL (ref 0.8–1.7)
TSI SER-ACNC: 1.55 MIU/ML (ref 0.36–3.74)
VIT D+METAB SERPL-MCNC: 28.6 NG/ML (ref 30–100)

## 2023-03-31 PROCEDURE — 84443 ASSAY THYROID STIM HORMONE: CPT | Performed by: INTERNAL MEDICINE

## 2023-03-31 PROCEDURE — 82306 VITAMIN D 25 HYDROXY: CPT | Performed by: INTERNAL MEDICINE

## 2023-03-31 PROCEDURE — 84439 ASSAY OF FREE THYROXINE: CPT | Performed by: INTERNAL MEDICINE

## 2023-03-31 PROCEDURE — 99214 OFFICE O/P EST MOD 30 MIN: CPT | Performed by: INTERNAL MEDICINE

## 2023-03-31 PROCEDURE — 84403 ASSAY OF TOTAL TESTOSTERONE: CPT | Performed by: INTERNAL MEDICINE

## 2023-03-31 PROCEDURE — 82670 ASSAY OF TOTAL ESTRADIOL: CPT | Performed by: INTERNAL MEDICINE

## 2023-03-31 PROCEDURE — 3078F DIAST BP <80 MM HG: CPT | Performed by: INTERNAL MEDICINE

## 2023-03-31 PROCEDURE — 80053 COMPREHEN METABOLIC PANEL: CPT | Performed by: INTERNAL MEDICINE

## 2023-03-31 PROCEDURE — 3008F BODY MASS INDEX DOCD: CPT | Performed by: INTERNAL MEDICINE

## 2023-03-31 PROCEDURE — 84402 ASSAY OF FREE TESTOSTERONE: CPT | Performed by: INTERNAL MEDICINE

## 2023-03-31 PROCEDURE — 3074F SYST BP LT 130 MM HG: CPT | Performed by: INTERNAL MEDICINE

## 2023-04-07 LAB
TESTOSTERONE TOTAL: 402.8 NG/DL
TESTOSTERONE, FREE -MS/MS: 84 PG/ML

## 2023-05-31 ENCOUNTER — OFFICE VISIT (OUTPATIENT)
Facility: CLINIC | Age: 28
End: 2023-05-31
Payer: COMMERCIAL

## 2023-05-31 VITALS
SYSTOLIC BLOOD PRESSURE: 130 MMHG | RESPIRATION RATE: 18 BRPM | HEART RATE: 96 BPM | OXYGEN SATURATION: 97 % | DIASTOLIC BLOOD PRESSURE: 88 MMHG

## 2023-05-31 DIAGNOSIS — E03.9 HYPOTHYROIDISM (ACQUIRED): Primary | ICD-10-CM

## 2023-05-31 PROCEDURE — 3079F DIAST BP 80-89 MM HG: CPT | Performed by: STUDENT IN AN ORGANIZED HEALTH CARE EDUCATION/TRAINING PROGRAM

## 2023-05-31 PROCEDURE — 99213 OFFICE O/P EST LOW 20 MIN: CPT | Performed by: STUDENT IN AN ORGANIZED HEALTH CARE EDUCATION/TRAINING PROGRAM

## 2023-05-31 PROCEDURE — 3075F SYST BP GE 130 - 139MM HG: CPT | Performed by: STUDENT IN AN ORGANIZED HEALTH CARE EDUCATION/TRAINING PROGRAM

## 2023-06-05 ENCOUNTER — HOSPITAL ENCOUNTER (OUTPATIENT)
Dept: GENERAL RADIOLOGY | Age: 28
Discharge: HOME | End: 2023-06-05
Attending: CHIROPRACTOR
Payer: COMMERCIAL

## 2023-06-05 DIAGNOSIS — M54.9 BACK PAIN: ICD-10-CM

## 2023-06-05 PROCEDURE — 72114 X-RAY EXAM L-S SPINE BENDING: CPT | Performed by: CHIROPRACTOR

## 2023-06-05 PROCEDURE — 72072 X-RAY EXAM THORAC SPINE 3VWS: CPT | Performed by: CHIROPRACTOR

## 2023-06-09 ENCOUNTER — OFFICE VISIT (OUTPATIENT)
Dept: INTERNAL MEDICINE CLINIC | Facility: CLINIC | Age: 28
End: 2023-06-09
Payer: COMMERCIAL

## 2023-06-09 VITALS
HEART RATE: 90 BPM | RESPIRATION RATE: 16 BRPM | TEMPERATURE: 97 F | HEIGHT: 65.75 IN | BODY MASS INDEX: 36.33 KG/M2 | SYSTOLIC BLOOD PRESSURE: 138 MMHG | DIASTOLIC BLOOD PRESSURE: 90 MMHG | OXYGEN SATURATION: 98 % | WEIGHT: 223.38 LBS

## 2023-06-09 DIAGNOSIS — L03.011 CELLULITIS OF FINGER OF RIGHT HAND: ICD-10-CM

## 2023-06-09 DIAGNOSIS — L30.9 DERMATITIS: Primary | ICD-10-CM

## 2023-06-09 PROCEDURE — 99213 OFFICE O/P EST LOW 20 MIN: CPT | Performed by: INTERNAL MEDICINE

## 2023-06-09 PROCEDURE — 3080F DIAST BP >= 90 MM HG: CPT | Performed by: INTERNAL MEDICINE

## 2023-06-09 PROCEDURE — 3008F BODY MASS INDEX DOCD: CPT | Performed by: INTERNAL MEDICINE

## 2023-06-09 PROCEDURE — 3075F SYST BP GE 130 - 139MM HG: CPT | Performed by: INTERNAL MEDICINE

## 2023-06-09 RX ORDER — CEPHALEXIN 500 MG/1
500 CAPSULE ORAL 4 TIMES DAILY
Qty: 20 CAPSULE | Refills: 0 | Status: SHIPPED | OUTPATIENT
Start: 2023-06-09 | End: 2023-06-14

## 2023-06-09 RX ORDER — VORTIOXETINE 10 MG/1
10 TABLET, FILM COATED ORAL DAILY
COMMUNITY
Start: 2023-05-28

## 2023-06-09 RX ORDER — PSEUDOEPHEDRINE HCL 30 MG
100 TABLET ORAL DAILY
COMMUNITY

## 2023-06-09 RX ORDER — DIAPER,BRIEF,INFANT-TODD,DISP
1 EACH MISCELLANEOUS 2 TIMES DAILY
Qty: 1 EACH | Refills: 0 | Status: SHIPPED | OUTPATIENT
Start: 2023-06-09 | End: 2023-06-16

## 2023-06-12 RX ORDER — ALBUTEROL SULFATE 2.5 MG/3ML
SOLUTION RESPIRATORY (INHALATION)
Qty: 300 ML | Refills: 0 | Status: SHIPPED | OUTPATIENT
Start: 2023-06-12

## 2023-06-12 RX ORDER — ALBUTEROL SULFATE 90 UG/1
AEROSOL, METERED RESPIRATORY (INHALATION)
Qty: 18 G | Refills: 0 | Status: SHIPPED | OUTPATIENT
Start: 2023-06-12

## 2023-06-20 ENCOUNTER — TELEPHONE (OUTPATIENT)
Dept: INTERNAL MEDICINE CLINIC | Facility: CLINIC | Age: 28
End: 2023-06-20

## 2023-06-20 NOTE — TELEPHONE ENCOUNTER
Patient dropped off form letter that he states Dr Sol Conti knows about. It is a example letter for Doc to follow.   Placed behind  in Dr Sol Conti folder

## 2023-06-21 RX ORDER — MONTELUKAST SODIUM 10 MG/1
TABLET ORAL
Qty: 90 TABLET | Refills: 0 | Status: SHIPPED | OUTPATIENT
Start: 2023-06-21

## 2023-06-21 NOTE — TELEPHONE ENCOUNTER
Letter pended , attempted to call patient to see which pronoun he uses and also which surgery he is looking to receive.

## 2023-06-21 NOTE — TELEPHONE ENCOUNTER
Asthma & COPD Medication Protocol Passed 06/20/2023 11:21 AM    Asthma Action Score greater than or equal to 20    Appointment in past 6 or next 3 months     AAP/ACT given in last 12 months     Future visit scheduled 9/29/23

## 2023-06-22 ENCOUNTER — PATIENT MESSAGE (OUTPATIENT)
Dept: INTERNAL MEDICINE CLINIC | Facility: CLINIC | Age: 28
End: 2023-06-22

## 2023-06-22 RX ORDER — LEVOTHYROXINE SODIUM 88 UG/1
TABLET ORAL
Qty: 16 TABLET | Refills: 0 | Status: SHIPPED | OUTPATIENT
Start: 2023-06-22

## 2023-06-23 NOTE — TELEPHONE ENCOUNTER
From: Valencia Bryant  To: Gia Bullard MD  Sent: 6/22/2023 10:18 AM CDT  Subject: Fax number for letter    Garret Islas,    Below is the fax information. I reviewed the letter that you wrote. The only thing that is incorrect is I was misdiagnosed with bipolar as an adolescent and have not had a diagnosis of bipolar since 14. My diagnosis is the following: depression, anxiety, ptsd, adhd, borderline personality disorder, panic disorder, and gender dysphoria.      Let me know if you have any questions    Thanks      ATTN: Dr. Dayanara Mahan  Fax: [phone]

## 2023-06-29 RX ORDER — LEVOTHYROXINE SODIUM 0.1 MG/1
TABLET ORAL
Qty: 12 TABLET | Refills: 5 | Status: SHIPPED | OUTPATIENT
Start: 2023-06-29

## 2023-07-19 RX ORDER — LEVOTHYROXINE SODIUM 88 UG/1
TABLET ORAL
Qty: 16 TABLET | Refills: 0 | Status: SHIPPED | OUTPATIENT
Start: 2023-07-19

## 2023-07-19 NOTE — TELEPHONE ENCOUNTER
LOV: 23    RTC: 6 months    FU: scheduled 10/4/23    Last Refill: 23    Month Supply Pendin month    Per OV notes from 23, \"- continue LT4 88mcg and 100mcg alternating, TFTs q 6 months\"    Patient last TFTs done 3/31/23- due around .

## 2023-07-27 NOTE — TELEPHONE ENCOUNTER
MA asking triage to call given pt wheezing. LMTCB 7/27 to ensure no urgent sxs. AS- would you refill? Pt's inhaler has stopped working and has been out for 3 days.

## 2023-07-27 NOTE — TELEPHONE ENCOUNTER
Called to confirm if patient needed albuterol, last refill wa on 06/12/23. Patient stated that he left inhaler in the car and inhaler was no longer working, and has been wheezing for the past 3 day.

## 2023-07-28 RX ORDER — ALBUTEROL SULFATE 90 UG/1
2 AEROSOL, METERED RESPIRATORY (INHALATION) EVERY 4 HOURS PRN
Qty: 8.5 G | Refills: 0 | Status: SHIPPED | OUTPATIENT
Start: 2023-07-28

## 2023-07-28 NOTE — TELEPHONE ENCOUNTER
Pt returned call. Pt has been having some problems with his asthma which he thinks is because of the air quality. Has also been having productive cough with yellow tinged mucous. He has not had his inhaler as it stopped working and has not done any neub treatments as he was unsure if he should. Informed him will send refill request to AS for inhaler. If no improvement, contact us. Pt asked how often he can use inhaler and neub. Informed him inhaler can be used Q4H PRN and neub can be Q6H PRN. Pt stated understanding and was thankful. Routing update to AS. Refill pended if agreeable.

## 2023-08-16 DIAGNOSIS — E03.9 HYPOTHYROIDISM (ACQUIRED): Primary | ICD-10-CM

## 2023-08-16 RX ORDER — LEVOTHYROXINE SODIUM 88 UG/1
TABLET ORAL
Qty: 16 TABLET | Refills: 2 | Status: SHIPPED | OUTPATIENT
Start: 2023-08-16

## 2023-08-16 NOTE — TELEPHONE ENCOUNTER
LOV: 5/13    RTC:    FU: 10/04    Last Refill: 7/19    Month Supply Pending:     LOV note: Long-standing hypoTH with +FHx of hypothyroidism, (+)anti-Tg.  Remains biochemically euthyroid on same dose of LT4  - continue LT4 88mcg and 100mcg alternating

## 2023-09-15 ENCOUNTER — TELEPHONE (OUTPATIENT)
Dept: INTERNAL MEDICINE CLINIC | Facility: CLINIC | Age: 28
End: 2023-09-15

## 2023-09-29 ENCOUNTER — LAB ENCOUNTER (OUTPATIENT)
Dept: LAB | Age: 28
End: 2023-09-29
Attending: STUDENT IN AN ORGANIZED HEALTH CARE EDUCATION/TRAINING PROGRAM
Payer: COMMERCIAL

## 2023-09-29 ENCOUNTER — TELEPHONE (OUTPATIENT)
Facility: CLINIC | Age: 28
End: 2023-09-29

## 2023-09-29 ENCOUNTER — OFFICE VISIT (OUTPATIENT)
Dept: INTERNAL MEDICINE CLINIC | Facility: CLINIC | Age: 28
End: 2023-09-29
Payer: COMMERCIAL

## 2023-09-29 VITALS
HEART RATE: 88 BPM | TEMPERATURE: 97 F | BODY MASS INDEX: 37.52 KG/M2 | RESPIRATION RATE: 16 BRPM | WEIGHT: 225.19 LBS | SYSTOLIC BLOOD PRESSURE: 136 MMHG | DIASTOLIC BLOOD PRESSURE: 86 MMHG | HEIGHT: 65 IN

## 2023-09-29 DIAGNOSIS — Z79.890 ENCOUNTER FOR MONITORING TESTOSTERONE REPLACEMENT THERAPY: ICD-10-CM

## 2023-09-29 DIAGNOSIS — Z00.00 PE (PHYSICAL EXAM), ANNUAL: Primary | ICD-10-CM

## 2023-09-29 DIAGNOSIS — Z51.81 ENCOUNTER FOR MONITORING TESTOSTERONE REPLACEMENT THERAPY: Primary | ICD-10-CM

## 2023-09-29 DIAGNOSIS — Z79.890 ENCOUNTER FOR MONITORING TESTOSTERONE REPLACEMENT THERAPY: Primary | ICD-10-CM

## 2023-09-29 DIAGNOSIS — F33.2 SEVERE EPISODE OF RECURRENT MAJOR DEPRESSIVE DISORDER, WITHOUT PSYCHOTIC FEATURES (HCC): ICD-10-CM

## 2023-09-29 DIAGNOSIS — Z51.81 ENCOUNTER FOR MONITORING TESTOSTERONE REPLACEMENT THERAPY: ICD-10-CM

## 2023-09-29 DIAGNOSIS — L20.82 FLEXURAL ECZEMA: ICD-10-CM

## 2023-09-29 DIAGNOSIS — E03.9 HYPOTHYROIDISM (ACQUIRED): ICD-10-CM

## 2023-09-29 LAB
T4 FREE SERPL-MCNC: 1 NG/DL (ref 0.8–1.7)
TSI SER-ACNC: 1.01 MIU/ML (ref 0.36–3.74)

## 2023-09-29 PROCEDURE — 3008F BODY MASS INDEX DOCD: CPT | Performed by: INTERNAL MEDICINE

## 2023-09-29 PROCEDURE — 3075F SYST BP GE 130 - 139MM HG: CPT | Performed by: INTERNAL MEDICINE

## 2023-09-29 PROCEDURE — 3079F DIAST BP 80-89 MM HG: CPT | Performed by: INTERNAL MEDICINE

## 2023-09-29 PROCEDURE — 84443 ASSAY THYROID STIM HORMONE: CPT | Performed by: STUDENT IN AN ORGANIZED HEALTH CARE EDUCATION/TRAINING PROGRAM

## 2023-09-29 PROCEDURE — 99395 PREV VISIT EST AGE 18-39: CPT | Performed by: INTERNAL MEDICINE

## 2023-09-29 PROCEDURE — 84439 ASSAY OF FREE THYROXINE: CPT | Performed by: STUDENT IN AN ORGANIZED HEALTH CARE EDUCATION/TRAINING PROGRAM

## 2023-09-29 RX ORDER — CLOTRIMAZOLE AND BETAMETHASONE DIPROPIONATE 10; .64 MG/G; MG/G
1 CREAM TOPICAL 2 TIMES DAILY
Qty: 45 G | Refills: 1 | Status: SHIPPED | OUTPATIENT
Start: 2023-09-29

## 2023-09-29 NOTE — TELEPHONE ENCOUNTER
Patient phoned into office stating that he went in for lab work today and had thyroid labs drawn, was under the impression he was to do a testosterone work up for Dr. Wendy Felder as he now wants to follow this with her. States that lab linda an extra vial of blood for office to put in add on orders. Pt was did not do these labs 8 am and fasting- can't add Testosterone labs to this. LOV note does not specify which labs Dr. Wendy Felder would like done. Patient also wondering if Dr. Wendy Felder would like any visit notes from Dr. Ani Robertson office, past Endo. Routed to [facility] 4 APN for review.

## 2023-09-29 NOTE — TELEPHONE ENCOUNTER
Spoke with patient on telephone, states he will hold off on lab and discuss with Dr. Ana Sequeira at in person visit 10/4. Closing this encounter.

## 2023-09-29 NOTE — TELEPHONE ENCOUNTER
Ordered free and total testosterone, to be completed at 8 AM fasting. If he would like he can also hold off til he talks with dr Ana Luisa Akers in case any other labs are needed.     Last 2 OV notes from Dr. Jonah Shi  would be great before visit

## 2023-10-04 ENCOUNTER — OFFICE VISIT (OUTPATIENT)
Facility: CLINIC | Age: 28
End: 2023-10-04
Payer: COMMERCIAL

## 2023-10-04 VITALS
DIASTOLIC BLOOD PRESSURE: 78 MMHG | HEART RATE: 86 BPM | HEIGHT: 65 IN | OXYGEN SATURATION: 97 % | BODY MASS INDEX: 37.49 KG/M2 | WEIGHT: 225 LBS | SYSTOLIC BLOOD PRESSURE: 126 MMHG

## 2023-10-04 DIAGNOSIS — Z51.81 ENCOUNTER FOR MONITORING TESTOSTERONE REPLACEMENT THERAPY: ICD-10-CM

## 2023-10-04 DIAGNOSIS — Z78.9 FEMALE-TO-MALE TRANSGENDER PERSON: Primary | ICD-10-CM

## 2023-10-04 DIAGNOSIS — E03.9 HYPOTHYROIDISM (ACQUIRED): ICD-10-CM

## 2023-10-04 DIAGNOSIS — Z79.890 ENCOUNTER FOR MONITORING TESTOSTERONE REPLACEMENT THERAPY: ICD-10-CM

## 2023-10-04 DIAGNOSIS — E55.9 VITAMIN D DEFICIENCY: ICD-10-CM

## 2023-10-04 PROCEDURE — 3008F BODY MASS INDEX DOCD: CPT | Performed by: STUDENT IN AN ORGANIZED HEALTH CARE EDUCATION/TRAINING PROGRAM

## 2023-10-04 PROCEDURE — 3078F DIAST BP <80 MM HG: CPT | Performed by: STUDENT IN AN ORGANIZED HEALTH CARE EDUCATION/TRAINING PROGRAM

## 2023-10-04 PROCEDURE — 3074F SYST BP LT 130 MM HG: CPT | Performed by: STUDENT IN AN ORGANIZED HEALTH CARE EDUCATION/TRAINING PROGRAM

## 2023-10-04 PROCEDURE — 99215 OFFICE O/P EST HI 40 MIN: CPT | Performed by: STUDENT IN AN ORGANIZED HEALTH CARE EDUCATION/TRAINING PROGRAM

## 2023-10-04 RX ORDER — FINASTERIDE 5 MG/1
5 TABLET, FILM COATED ORAL DAILY
Qty: 90 TABLET | Refills: 0 | Status: SHIPPED | OUTPATIENT
Start: 2023-10-04

## 2023-10-04 RX ORDER — TESTOSTERONE CYPIONATE 200 MG/ML
INJECTION, SOLUTION INTRAMUSCULAR
Qty: 10 ML | Refills: 1 | Status: SHIPPED | OUTPATIENT
Start: 2023-10-04

## 2023-10-05 ENCOUNTER — TELEPHONE (OUTPATIENT)
Facility: CLINIC | Age: 28
End: 2023-10-05

## 2023-10-05 NOTE — TELEPHONE ENCOUNTER
10/5/23-Faxed medical records request to Dr. Brandt Skiff at [facility]. [facility]#[phone]. Will await medical records.

## 2023-10-06 PROBLEM — L20.82 FLEXURAL ECZEMA: Status: ACTIVE | Noted: 2023-10-06

## 2023-10-19 ENCOUNTER — PATIENT MESSAGE (OUTPATIENT)
Facility: CLINIC | Age: 28
End: 2023-10-19

## 2023-10-19 DIAGNOSIS — Z78.9 FEMALE-TO-MALE TRANSGENDER PERSON: Primary | ICD-10-CM

## 2023-10-20 RX ORDER — MONTELUKAST SODIUM 10 MG/1
TABLET ORAL
Qty: 90 TABLET | Refills: 0 | Status: SHIPPED | OUTPATIENT
Start: 2023-10-20

## 2023-10-20 NOTE — TELEPHONE ENCOUNTER
PASSED per protocol, refill sent.   Last PE 9/29/23  Future Appointments   Date Time Provider Nika Avalos   2/2/2024  9:30 AM Luh Chang MD [facility]   4/3/2024  3:00 PM Federico Bennett MD [facility] [place]

## 2023-10-20 NOTE — TELEPHONE ENCOUNTER
From: Allegra Barreto  To: Matthew Draft  Sent: 10/19/2023 7:45 PM CDT  Subject: Danell Hark Dr. Collette Rast,  These are the syringes I use for my testosterone. If you could please send them to the pharmacy.    BD 25 gauge 1 inch 3 ml    Thank you

## 2023-10-20 NOTE — TELEPHONE ENCOUNTER
LOV: 10/4/23    RTC: 6 months     FU: scheduled 4/3/24    Last Refill: 3/11/19    Month Supply Pendin weeks       Per LOV note on 10/4/23 \"- continue IM test 100mg q week\".

## 2023-10-23 RX ORDER — NEEDLES, FILTER 19GX1 1/2"
1 NEEDLE, DISPOSABLE MISCELLANEOUS WEEKLY
Qty: 12 EACH | Refills: 0 | Status: SHIPPED | OUTPATIENT
Start: 2023-10-23

## 2023-10-23 RX ORDER — NEEDLES, DISPOSABLE 25GX5/8"
NEEDLE, DISPOSABLE MISCELLANEOUS
Qty: 12 EACH | Refills: 0 | Status: SHIPPED | OUTPATIENT
Start: 2023-10-23

## 2023-12-05 DIAGNOSIS — E03.9 HYPOTHYROIDISM (ACQUIRED): ICD-10-CM

## 2023-12-05 RX ORDER — LEVOTHYROXINE SODIUM 88 UG/1
TABLET ORAL
Qty: 16 TABLET | Refills: 2 | Status: SHIPPED | OUTPATIENT
Start: 2023-12-05

## 2023-12-05 NOTE — TELEPHONE ENCOUNTER
LOV: 10/4/23    RTC: 6 months     FU: 4/3/24    Last Refill: 8/16/23- 3 months supply     Month Supply Pending: 3 months supply    Component      Latest Ref Rng 9/29/2023   TSH      0.358 - 3.740 mIU/mL 1.010    T4,Free (Direct)      0.8 - 1.7 ng/dL 1.0      Per 10/4/23 OV notes,   \"- continue LT4 88mcg and 100mcg alternating  - TFTs q 6 months\"

## 2023-12-27 ENCOUNTER — LAB ENCOUNTER (OUTPATIENT)
Dept: LAB | Age: 28
End: 2023-12-27
Attending: INTERNAL MEDICINE
Payer: COMMERCIAL

## 2023-12-27 DIAGNOSIS — E55.9 VITAMIN D DEFICIENCY: ICD-10-CM

## 2023-12-27 DIAGNOSIS — Z78.9 FEMALE-TO-MALE TRANSGENDER PERSON: ICD-10-CM

## 2023-12-27 LAB
ERYTHROCYTE [DISTWIDTH] IN BLOOD BY AUTOMATED COUNT: 12.6 %
ESTRADIOL SERPL-MCNC: 43 PG/ML
HCT VFR BLD AUTO: 48.5 %
HGB BLD-MCNC: 16.2 G/DL
MCH RBC QN AUTO: 29.2 PG (ref 26–34)
MCHC RBC AUTO-ENTMCNC: 33.4 G/DL (ref 31–37)
MCV RBC AUTO: 87.5 FL
PLATELET # BLD AUTO: 214 10(3)UL (ref 150–450)
RBC # BLD AUTO: 5.54 X10(6)UL
TESTOST SERPL-MCNC: 622 NG/DL
VIT D+METAB SERPL-MCNC: 33.1 NG/ML (ref 30–100)
WBC # BLD AUTO: 9.3 X10(3) UL (ref 4–11)

## 2023-12-27 PROCEDURE — 82306 VITAMIN D 25 HYDROXY: CPT | Performed by: STUDENT IN AN ORGANIZED HEALTH CARE EDUCATION/TRAINING PROGRAM

## 2023-12-27 PROCEDURE — 85027 COMPLETE CBC AUTOMATED: CPT | Performed by: STUDENT IN AN ORGANIZED HEALTH CARE EDUCATION/TRAINING PROGRAM

## 2023-12-27 PROCEDURE — 84403 ASSAY OF TOTAL TESTOSTERONE: CPT | Performed by: STUDENT IN AN ORGANIZED HEALTH CARE EDUCATION/TRAINING PROGRAM

## 2023-12-27 PROCEDURE — 82670 ASSAY OF TOTAL ESTRADIOL: CPT | Performed by: STUDENT IN AN ORGANIZED HEALTH CARE EDUCATION/TRAINING PROGRAM

## 2024-01-13 DIAGNOSIS — Z78.9 FEMALE-TO-MALE TRANSGENDER PERSON: ICD-10-CM

## 2024-01-15 RX ORDER — FINASTERIDE 5 MG/1
5 TABLET, FILM COATED ORAL DAILY
Qty: 90 TABLET | Refills: 0 | Status: SHIPPED | OUTPATIENT
Start: 2024-01-15

## 2024-01-15 RX ORDER — MONTELUKAST SODIUM 10 MG/1
TABLET ORAL
Qty: 90 TABLET | Refills: 0 | Status: SHIPPED | OUTPATIENT
Start: 2024-01-15

## 2024-01-15 NOTE — TELEPHONE ENCOUNTER
PASSED per protocol, refill sent.    Last PE-- 9--A>S>     Future Appointments   Date Time Provider Department Center   2/2/2024  9:30 AM Schriedel, Adam, MD [facility]   4/3/2024  3:00 PM Johann De Leon MD [facility] [place]

## 2024-01-15 NOTE — TELEPHONE ENCOUNTER
LOV:10/04    RTC:    FU:     Last Refill:     Month Supply Pendin days supply    Last office visit note: - continue finasteride 5mg daily     Refill pended and routed for review.

## 2024-01-17 DIAGNOSIS — E03.9 HYPOTHYROIDISM (ACQUIRED): Primary | ICD-10-CM

## 2024-01-17 RX ORDER — LEVOTHYROXINE SODIUM 0.1 MG/1
TABLET ORAL
Qty: 12 TABLET | Refills: 3 | Status: SHIPPED | OUTPATIENT
Start: 2024-01-17

## 2024-01-17 NOTE — TELEPHONE ENCOUNTER
LOV: 10/04    RTC: 6 month     FU:    Last Refill:     Month Supply Pendin days supply    Last office visit note: Long-standing Hashimoto's with +FHx of hypothyroidism, (+)anti-Tg. Remains biochemically euthyroid on same dose of LT4  - continue LT4 88mcg and 100mcg alternating  - TFTs q 6 months    Refill pended and routed for review.

## 2024-02-02 ENCOUNTER — OFFICE VISIT (OUTPATIENT)
Dept: INTERNAL MEDICINE CLINIC | Facility: CLINIC | Age: 29
End: 2024-02-02
Payer: COMMERCIAL

## 2024-02-02 VITALS
BODY MASS INDEX: 37.85 KG/M2 | WEIGHT: 227.19 LBS | DIASTOLIC BLOOD PRESSURE: 56 MMHG | RESPIRATION RATE: 18 BRPM | SYSTOLIC BLOOD PRESSURE: 118 MMHG | OXYGEN SATURATION: 98 % | HEART RATE: 90 BPM | HEIGHT: 65 IN

## 2024-02-02 DIAGNOSIS — F31.9 BIPOLAR 1 DISORDER (HCC): ICD-10-CM

## 2024-02-02 DIAGNOSIS — Z13.220 SCREENING CHOLESTEROL LEVEL: ICD-10-CM

## 2024-02-02 DIAGNOSIS — E03.8 HYPOTHYROIDISM, SECONDARY: Primary | ICD-10-CM

## 2024-02-02 DIAGNOSIS — H91.91 CHANGE IN HEARING OF RIGHT EAR: ICD-10-CM

## 2024-02-02 PROBLEM — T14.91XA SUICIDE ATTEMPT (HCC): Status: RESOLVED | Noted: 2018-06-20 | Resolved: 2024-02-02

## 2024-02-02 PROCEDURE — 3008F BODY MASS INDEX DOCD: CPT | Performed by: INTERNAL MEDICINE

## 2024-02-02 PROCEDURE — 3074F SYST BP LT 130 MM HG: CPT | Performed by: INTERNAL MEDICINE

## 2024-02-02 PROCEDURE — 99214 OFFICE O/P EST MOD 30 MIN: CPT | Performed by: INTERNAL MEDICINE

## 2024-02-02 PROCEDURE — 3078F DIAST BP <80 MM HG: CPT | Performed by: INTERNAL MEDICINE
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
## 2024-02-16 PROBLEM — F33.2 MAJOR DEPRESSIVE DISORDER, RECURRENT SEVERE WITHOUT PSYCHOTIC FEATURES (HCC): Status: ACTIVE | Noted: 2024-02-16

## 2024-02-16 PROBLEM — R45.851 SUICIDAL IDEATION: Status: ACTIVE | Noted: 2024-02-16

## 2024-03-06 ENCOUNTER — TELEPHONE (OUTPATIENT)
Dept: INTERNAL MEDICINE CLINIC | Facility: CLINIC | Age: 29
End: 2024-03-06

## 2024-03-06 NOTE — TELEPHONE ENCOUNTER
Pt stated that he is need of pre op appt with [facility].. his surgery date is 05/28/24 with Dr. Wade at [facility] and he is having Metoidioplasty completed. pt stated due to the nature of the surgery pt feels more comfortable with [facility] completing the exam. No openings available

## 2024-03-06 NOTE — TELEPHONE ENCOUNTER
AS, do you want Inga to find a spot for this patient on your sched? Or see partner. He needs pre-op and feels more comfortable with you. Surg not until May (gender-affirming surg).

## 2024-03-12 DIAGNOSIS — E03.9 HYPOTHYROIDISM (ACQUIRED): ICD-10-CM

## 2024-03-12 NOTE — TELEPHONE ENCOUNTER
3/12/24-Received via fax from [facility] a Refill request for Levothyroxine 88mcg tablet.  Placed in PA in basket.

## 2024-03-12 NOTE — TELEPHONE ENCOUNTER
REJI to sched pre op w/[facility] at the end of his sched sometime in May-also pt to give us the phone number to Dr. Wade so we can fax our pre op paperwork to them.

## 2024-03-13 RX ORDER — LEVOTHYROXINE SODIUM 88 UG/1
TABLET ORAL
Qty: 16 TABLET | Refills: 2 | Status: SHIPPED | OUTPATIENT
Start: 2024-03-13

## 2024-03-13 NOTE — TELEPHONE ENCOUNTER
TC to Dr. Chrissy Wade office at [place].  Clearance information received and placed in [facility] red folder.    Future Appointments   Date Time Provider Department Center   4/11/2024  4:00 PM Pura Mccoy DO [id number] [facility] [place]   4/18/2024  3:45 PM [facility] EM[facility] [id number]   4/18/2024  4:00 PM Navin Novak MD [facility] [id number]   5/3/2024 12:30 PM Schriedel, Adam, MD [facility]   7/12/2024 10:30 AM Schriedel, Adam, MD [facility] [facility]

## 2024-03-27 DIAGNOSIS — Z78.9 FEMALE-TO-MALE TRANSGENDER PERSON: ICD-10-CM

## 2024-04-03 NOTE — TELEPHONE ENCOUNTER
Received fax from [facility]'s pharmacy looking  for same rx refill.    Attempt to phone patient again, no answer, left message per JESSICA asking for call back.    Patient has appointment scheduled with Dr. Mccoy on 4/11/24.    Has surgery scheduled on 5/28/24- [facility].    Looking to see if rx refill needed?    Rx signed

## 2024-04-05 RX ORDER — NEEDLES, FILTER 19GX1 1/2"
NEEDLE, DISPOSABLE MISCELLANEOUS
Refills: 0 | OUTPATIENT
Start: 2024-04-05

## 2024-04-05 NOTE — TELEPHONE ENCOUNTER
Have not heard from patient.    Additional ALICE Appt message sent to patient.    Closing this encounter for now.

## 2024-04-18 ENCOUNTER — OFFICE VISIT (OUTPATIENT)
Facility: LOCATION | Age: 29
End: 2024-04-18
Payer: COMMERCIAL

## 2024-04-18 DIAGNOSIS — H90.3 SENSORINEURAL HEARING LOSS (SNHL) OF BOTH EARS: Primary | ICD-10-CM

## 2024-04-18 DIAGNOSIS — H93.19 TINNITUS, UNSPECIFIED LATERALITY: ICD-10-CM

## 2024-04-18 DIAGNOSIS — H90.3 SENSORINEURAL HEARING LOSS, BILATERAL: Primary | ICD-10-CM

## 2024-04-18 PROCEDURE — 92557 COMPREHENSIVE HEARING TEST: CPT | Performed by: AUDIOLOGIST

## 2024-04-18 PROCEDURE — 99203 OFFICE O/P NEW LOW 30 MIN: CPT | Performed by: OTOLARYNGOLOGY

## 2024-04-18 PROCEDURE — 92567 TYMPANOMETRY: CPT | Performed by: AUDIOLOGIST

## 2024-04-18 NOTE — PROGRESS NOTES
Evan Goodwin was seen for an audiometric evaluation and tympanogram today. Referred back to physician.    Janessa Harris, AuD
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
## 2024-04-22 DIAGNOSIS — Z78.9 FEMALE-TO-MALE TRANSGENDER PERSON: ICD-10-CM

## 2024-04-22 RX ORDER — MONTELUKAST SODIUM 10 MG/1
TABLET ORAL
Qty: 90 TABLET | Refills: 0 | Status: SHIPPED | OUTPATIENT
Start: 2024-04-22

## 2024-04-22 NOTE — TELEPHONE ENCOUNTER
LOV: 10/04    RTC:    FU:6/28 with Dr. Cool    Last Refill: 3/27    Month Supply Pending:   Last office visit note: - continue finasteride 5mg daily     Refill pended and routed for review

## 2024-04-22 NOTE — TELEPHONE ENCOUNTER
4/22/24-Received via fax from [facility] a Refill request for Finasteride oral tablet 5mg.  Placed in PA in basket.

## 2024-04-23 RX ORDER — FINASTERIDE 5 MG/1
5 TABLET, FILM COATED ORAL DAILY
Qty: 90 TABLET | Refills: 1 | Status: SHIPPED | OUTPATIENT
Start: 2024-04-23

## 2024-05-14 DIAGNOSIS — E03.9 HYPOTHYROIDISM (ACQUIRED): ICD-10-CM

## 2024-05-14 RX ORDER — LEVOTHYROXINE SODIUM 0.1 MG/1
TABLET ORAL
Qty: 12 TABLET | Refills: 3 | Status: SHIPPED | OUTPATIENT
Start: 2024-05-14

## 2024-05-14 NOTE — TELEPHONE ENCOUNTER
LOV: 10/04 with Dr. De Leon    FU: - Dr. Cool    Last Refill:     Month Supply Pendin    Last lab result:   Component  Ref Range & Units 24  7:20 AM   TSH  0.450 - 5.500 uIU/mL 1.323     Last office note: Oct 2023  2023 - fT4 1.0, TSH 1.0 -- LT4 88mcg and 100mcg alternating      Refill pended and routed for review.

## 2024-06-15 DIAGNOSIS — E03.9 HYPOTHYROIDISM (ACQUIRED): ICD-10-CM

## 2024-06-17 RX ORDER — LEVOTHYROXINE SODIUM 88 UG/1
TABLET ORAL
Qty: 16 TABLET | Refills: 0 | Status: SHIPPED | OUTPATIENT
Start: 2024-06-17

## 2024-06-17 NOTE — TELEPHONE ENCOUNTER
LOV: 10/04    RTC:    FU:6/28    Last Refill:5/26    Component  Ref Range & Units 2/16/24  7:20 AM   TSH  0.450 - 5.500 uIU/mL 1.323      Last office note: Oct 2023  9/2023 - fT4 1.0, TSH 1.0 -- LT4 88mcg and 100mcg alternating

## 2024-06-19 ENCOUNTER — PATIENT MESSAGE (OUTPATIENT)
Dept: INTERNAL MEDICINE CLINIC | Facility: CLINIC | Age: 29
End: 2024-06-19

## 2024-06-20 NOTE — TELEPHONE ENCOUNTER
From: Evan Goodwin  To: Adam Schriedel  Sent: 6/19/2024 11:15 AM CDT  Subject: Medical form for license     Hi Dr. Schriedel,  I got pulled over yesterday and when the officer ran my license it came back that the  cancelled it back in June of last year. The officer said he’d only seen that once before and didn’t know what it meant. I looked it up and it means they have some incorrect information. Most commonly it has to do with a medical form. I don’t know what the issue is because I had you fill out that form last year in January when I renewed my license. The only other thing I can think of is something got messed up because I got a real ID and they need your birth certificate and I still haven’t gotten anything on that changed. Either way, I’m going to need the medical form refilled out to get my license back. I’ve been driving without a valid license for a year now, but I didn’t know. I need to get this taken care of asap. If I come in on Friday could you really quickly fill out the form? I’m having my psychiatrist fill out her half tomorrow.

## 2024-06-24 NOTE — TELEPHONE ENCOUNTER
Patient came in to the office to drop off the form. Form  placed in Dr Adam Schriedel form folder and a copy in the copy folder. Once complete patient wants it sent to the  via faxed the fax number is [phone]    Section 2 needs to be completed by a provider. This can't wait till Friday patient stated if he gets pulled over he will get arrested. Patient would like a call once complete. Stated this is urgent for him. Asking if a provider that is on call can fill it out.

## 2024-06-25 ENCOUNTER — MED REC SCAN ONLY (OUTPATIENT)
Dept: INTERNAL MEDICINE CLINIC | Facility: CLINIC | Age: 29
End: 2024-06-25

## 2024-06-27 ENCOUNTER — TELEPHONE (OUTPATIENT)
Facility: CLINIC | Age: 29
End: 2024-06-27

## 2024-06-27 ENCOUNTER — LAB ENCOUNTER (OUTPATIENT)
Dept: LAB | Age: 29
End: 2024-06-27
Attending: STUDENT IN AN ORGANIZED HEALTH CARE EDUCATION/TRAINING PROGRAM

## 2024-06-27 DIAGNOSIS — Z78.9 FEMALE-TO-MALE TRANSGENDER PERSON: ICD-10-CM

## 2024-06-27 DIAGNOSIS — E03.9 HYPOTHYROIDISM (ACQUIRED): ICD-10-CM

## 2024-06-27 DIAGNOSIS — E03.8 HYPOTHYROIDISM, SECONDARY: Primary | ICD-10-CM

## 2024-06-27 DIAGNOSIS — F64.9 GENDER DYSPHORIA: ICD-10-CM

## 2024-06-27 DIAGNOSIS — E55.9 VITAMIN D DEFICIENCY: ICD-10-CM

## 2024-06-27 LAB
T4 FREE SERPL-MCNC: 1.1 NG/DL (ref 0.8–1.7)
TSI SER-ACNC: 0.79 MIU/ML (ref 0.36–3.74)

## 2024-06-27 PROCEDURE — 84443 ASSAY THYROID STIM HORMONE: CPT | Performed by: STUDENT IN AN ORGANIZED HEALTH CARE EDUCATION/TRAINING PROGRAM

## 2024-06-27 PROCEDURE — 84439 ASSAY OF FREE THYROXINE: CPT | Performed by: STUDENT IN AN ORGANIZED HEALTH CARE EDUCATION/TRAINING PROGRAM

## 2024-06-27 NOTE — TELEPHONE ENCOUNTER
Remaining labs were not able to be added on to blood given in lab today so new draws have been ordered. Pt had a CBC done four weeks ago so this does not need to be repeated this early.     Testosterone lab should be done fasting midway between weekly injections or accuracy. If tomorrow is about residential between injections, can do tomorrow, otherwise recommend timing appropriately between injections so that repeat draws are not needed. Thanks!

## 2024-06-27 NOTE — TELEPHONE ENCOUNTER
Pt was notified that labs where put in for him to get them drawn.  Pt will get labs done in the tomorrow morning 06/28/2024     Closing encounter

## 2024-06-27 NOTE — TELEPHONE ENCOUNTER
06/27/2024 pt called, would like labs added for testosterone due to he is going to get labs done today, also he would like all labs that  ordered in the past.     Routed to provider

## 2024-06-28 ENCOUNTER — OFFICE VISIT (OUTPATIENT)
Facility: CLINIC | Age: 29
End: 2024-06-28

## 2024-06-28 VITALS
HEIGHT: 65 IN | HEART RATE: 110 BPM | BODY MASS INDEX: 38.49 KG/M2 | DIASTOLIC BLOOD PRESSURE: 70 MMHG | WEIGHT: 231 LBS | RESPIRATION RATE: 18 BRPM | SYSTOLIC BLOOD PRESSURE: 130 MMHG | OXYGEN SATURATION: 97 %

## 2024-06-28 DIAGNOSIS — Z78.9 FEMALE-TO-MALE TRANSGENDER PERSON: ICD-10-CM

## 2024-06-28 DIAGNOSIS — Z51.81 ENCOUNTER FOR MONITORING TESTOSTERONE REPLACEMENT THERAPY: Primary | ICD-10-CM

## 2024-06-28 DIAGNOSIS — E55.9 VITAMIN D DEFICIENCY: ICD-10-CM

## 2024-06-28 DIAGNOSIS — E03.8 HYPOTHYROIDISM, SECONDARY: Primary | ICD-10-CM

## 2024-06-28 DIAGNOSIS — Z79.890 ENCOUNTER FOR MONITORING TESTOSTERONE REPLACEMENT THERAPY: Primary | ICD-10-CM

## 2024-06-28 DIAGNOSIS — F64.9 GENDER DYSPHORIA: ICD-10-CM

## 2024-06-28 PROCEDURE — 3075F SYST BP GE 130 - 139MM HG: CPT | Performed by: STUDENT IN AN ORGANIZED HEALTH CARE EDUCATION/TRAINING PROGRAM

## 2024-06-28 PROCEDURE — 99215 OFFICE O/P EST HI 40 MIN: CPT | Performed by: STUDENT IN AN ORGANIZED HEALTH CARE EDUCATION/TRAINING PROGRAM

## 2024-06-28 PROCEDURE — 3008F BODY MASS INDEX DOCD: CPT | Performed by: STUDENT IN AN ORGANIZED HEALTH CARE EDUCATION/TRAINING PROGRAM

## 2024-06-28 PROCEDURE — 3078F DIAST BP <80 MM HG: CPT | Performed by: STUDENT IN AN ORGANIZED HEALTH CARE EDUCATION/TRAINING PROGRAM

## 2024-06-28 RX ORDER — TESTOSTERONE CYPIONATE 1000 MG/10ML
100 INJECTION, SOLUTION INTRAMUSCULAR
Qty: 10 ML | Refills: 1 | Status: SHIPPED | OUTPATIENT
Start: 2024-06-28 | End: 2024-07-01

## 2024-06-28 NOTE — PATIENT INSTRUCTIONS
Let's have you stick with very steady testosterone administration until at least August and then plan to recheck a testosterone level midway between the week.   Your thyroid levels looked great so we can continue the same dose.   Keep me updated on any surgery updates.     Return Visit   [  ] Physician in August/September  [  ] After visit summary   [  ] Fasting/8AM labs
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
## 2024-06-28 NOTE — TELEPHONE ENCOUNTER
6/28/24 Pharmacy called needing a clarification on Rx refill    Please call back    They also sent a fax requesting same

## 2024-06-30 PROBLEM — Z87.890 STATUS POST GENDER AFFIRMATION SURGERY: Status: ACTIVE | Noted: 2024-06-30

## 2024-07-01 RX ORDER — TESTOSTERONE CYPIONATE 200 MG/ML
100 VIAL (ML) INTRAMUSCULAR WEEKLY
Qty: 6 ML | Refills: 1 | Status: SHIPPED | OUTPATIENT
Start: 2024-07-01 | End: 2024-12-16

## 2024-07-02 DIAGNOSIS — Z78.9 FEMALE-TO-MALE TRANSGENDER PERSON: ICD-10-CM

## 2024-07-02 RX ORDER — SYRINGE, DISPOSABLE, 3 ML
SYRINGE, EMPTY DISPOSABLE MISCELLANEOUS
Qty: 12 EACH | Refills: 0 | Status: SHIPPED | OUTPATIENT
Start: 2024-07-02

## 2024-07-02 NOTE — TELEPHONE ENCOUNTER
Fax from pharmacy: pt is [facility] RX for syringes only     LOV: 06/28/2024     Next office visit: 08/16/2024     Last filled: 04/10/2024    Order pended and routed to provider

## 2024-07-12 ENCOUNTER — OFFICE VISIT (OUTPATIENT)
Dept: INTERNAL MEDICINE CLINIC | Facility: CLINIC | Age: 29
End: 2024-07-12
Payer: COMMERCIAL

## 2024-07-12 VITALS
DIASTOLIC BLOOD PRESSURE: 72 MMHG | BODY MASS INDEX: 37.76 KG/M2 | WEIGHT: 226.63 LBS | HEART RATE: 108 BPM | SYSTOLIC BLOOD PRESSURE: 114 MMHG | HEIGHT: 65 IN | OXYGEN SATURATION: 99 % | RESPIRATION RATE: 18 BRPM | TEMPERATURE: 97 F

## 2024-07-12 DIAGNOSIS — K04.7 DENTAL INFECTION: Primary | ICD-10-CM

## 2024-07-12 DIAGNOSIS — E03.8 HYPOTHYROIDISM, SECONDARY: ICD-10-CM

## 2024-07-12 DIAGNOSIS — E66.9 OBESITY (BMI 30-39.9): ICD-10-CM

## 2024-07-12 DIAGNOSIS — F33.2 MAJOR DEPRESSIVE DISORDER, RECURRENT SEVERE WITHOUT PSYCHOTIC FEATURES (HCC): ICD-10-CM

## 2024-07-12 PROCEDURE — 3078F DIAST BP <80 MM HG: CPT | Performed by: INTERNAL MEDICINE

## 2024-07-12 PROCEDURE — 3008F BODY MASS INDEX DOCD: CPT | Performed by: INTERNAL MEDICINE

## 2024-07-12 PROCEDURE — 3074F SYST BP LT 130 MM HG: CPT | Performed by: INTERNAL MEDICINE

## 2024-07-12 PROCEDURE — 99214 OFFICE O/P EST MOD 30 MIN: CPT | Performed by: INTERNAL MEDICINE

## 2024-07-12 RX ORDER — SEMAGLUTIDE 0.68 MG/ML
0.25 INJECTION, SOLUTION SUBCUTANEOUS WEEKLY
Qty: 1 EACH | Refills: 3 | Status: SHIPPED | OUTPATIENT
Start: 2024-07-12

## 2024-07-12 RX ORDER — IBUPROFEN 600 MG/1
600 TABLET ORAL EVERY 6 HOURS PRN
COMMUNITY
Start: 2024-07-02

## 2024-07-22 ENCOUNTER — TELEPHONE (OUTPATIENT)
Dept: INTERNAL MEDICINE CLINIC | Facility: CLINIC | Age: 29
End: 2024-07-22

## 2024-07-22 DIAGNOSIS — E03.9 HYPOTHYROIDISM (ACQUIRED): ICD-10-CM

## 2024-07-23 ENCOUNTER — PATIENT MESSAGE (OUTPATIENT)
Dept: INTERNAL MEDICINE CLINIC | Facility: CLINIC | Age: 29
End: 2024-07-23

## 2024-07-23 RX ORDER — LEVOTHYROXINE SODIUM 88 UG/1
TABLET ORAL
Qty: 48 TABLET | Refills: 1 | Status: SHIPPED | OUTPATIENT
Start: 2024-07-23

## 2024-07-23 NOTE — TELEPHONE ENCOUNTER
Endocrine refill protocol for medications for hypothyroidism and hyperthyroidism    Protocol Criteria:  Appointment with Endocrinology completed in the last 12 months or scheduled in the next 6 months     Verify appointment has been completed or scheduled in the appropriate timeline. If so can send a 90 day supply with 1 refill per provider protocol.    Normal TSH result in the past 12 months   Review recent telephone encounters and mychart communications with patient to ensure a dose change has not occurred since last office visit that was not updated in the medication history list   Last completed office visit:6/28/2024 Mary Cool DO   Next scheduled Follow up:   Future Appointments   Date Time Provider Department Center   10/11/2024  7:00 AM Schriedel, Adam, MD [facility]   10/14/2024  4:00 PM Mary Cool DO [id number] [facility] [place]      Last TSH result:   TSH   Date Value Ref Range Status   06/27/2024 0.793 0.358 - 3.740 mIU/mL Final     Comment:     This test may exhibit interference when a sample is collected from a person who is consuming high dose of biotin (a.k.a., vitamin B7, vitamin H, coenzyme R) supplements resulting in serum concentrations >100 ng/mL.  Intake of the recommended daily allowance (RDA) for biotin (0.03 mg) has not been shown to typically cause significant interference; however, high dose daily dietary supplements may contain biotin concentrations greater than 150 times (5-10 mg) the RDA.  It is recommended that physicians ask all patients who may be on biotin supplementation to stop biotin consumption at least 72 hours prior to collection of a new sample.     07/07/2014 0.484 0.350 - 5.500 mIU/mL Final     Per LOV dated 6/28/24:  \"- continue LT4 88mcg and 100mcg alternating  - TFTs q 6 months\"    Refill protocol passed, 90 day fill + 1 refill sent to pharmacy.

## 2024-07-23 NOTE — TELEPHONE ENCOUNTER
PA was unable to be completed in EPIC.  PA resubmitted per CoverMyMeds. Key: [id number].     Response:  Information regarding your request  Product not covered for this health plan/disease state/medication as submitted. Product not covered by this plan for this diagnosis. For a FDA label approved diagnosis, please resubmit with an ICD 10 code or submit via other methods.  [employer] message sent to patient to research discount coupon.

## 2024-07-26 ENCOUNTER — MED REC SCAN ONLY (OUTPATIENT)
Facility: CLINIC | Age: 29
End: 2024-07-26
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
## 2024-09-09 DIAGNOSIS — Z78.9 FEMALE-TO-MALE TRANSGENDER PERSON: ICD-10-CM

## 2024-09-09 RX ORDER — SYRINGE, DISPOSABLE, 1 ML
SYRINGE, EMPTY DISPOSABLE MISCELLANEOUS
Qty: 12 EACH | Refills: 1 | Status: SHIPPED | OUTPATIENT
Start: 2024-09-09

## 2024-09-09 NOTE — TELEPHONE ENCOUNTER
Endocrine Refill protocol for Glucose testing supplies     Protocol Criteria: PASSED Reason: N/A  Appointment with Endocrinology completed in the last 12 months or scheduled in the next 6 months     Verify appointment has been completed or scheduled in the appropriate timeline. If so can send a 90 day supply with 1 refill.     Last completed office visit: 6/28/2024 Mary Cool DO   Next scheduled Follow up:   Future Appointments   Date Time Provider Department Center   10/11/2024  7:00 AM Schriedel, Adam, MD [facility]   10/14/2024  4:00 PM Mary Cool DO [id number] [facility] [place]       Refill passed protocol, ordered per protocol.

## 2024-09-22 DIAGNOSIS — E03.9 HYPOTHYROIDISM (ACQUIRED): ICD-10-CM

## 2024-09-23 RX ORDER — LEVOTHYROXINE SODIUM 100 UG/1
TABLET ORAL
Qty: 12 TABLET | Refills: 1 | Status: SHIPPED | OUTPATIENT
Start: 2024-09-23

## 2024-09-23 NOTE — TELEPHONE ENCOUNTER
Endocrine refill protocol for medications for hypothyroidism and hyperthyroidism    Protocol Criteria:  PASSED Reason: N/A  Appointment with Endocrinology completed in the last 12 months or scheduled in the next 6 months     Verify appointment has been completed or scheduled in the appropriate timeline. If so can send a 90 day supply with 1 refill per provider protocol.    Normal TSH result in the past 12 months   Review recent telephone encounters and mychart communications with patient to ensure a dose change has not occurred since last office visit that was not updated in the medication history list   Last completed office visit:6/28/2024 Mary Cool DO   Next scheduled Follow up:   Future Appointments   Date Time Provider Department Center   10/11/2024  7:00 AM Schriedel, Adam, MD [facility]   10/14/2024  4:00 PM Mary Cool DO [id number] [facility] [place]      Last TSH result:   TSH   Date Value Ref Range Status   06/27/2024 0.793 0.358 - 3.740 mIU/mL Final     Comment:     This test may exhibit interference when a sample is collected from a person who is consuming high dose of biotin (a.k.a., vitamin B7, vitamin H, coenzyme R) supplements resulting in serum concentrations >100 ng/mL.  Intake of the recommended daily allowance (RDA) for biotin (0.03 mg) has not been shown to typically cause significant interference; however, high dose daily dietary supplements may contain biotin concentrations greater than 150 times (5-10 mg) the RDA.  It is recommended that physicians ask all patients who may be on biotin supplementation to stop biotin consumption at least 72 hours prior to collection of a new sample.     07/07/2014 0.484 0.350 - 5.500 mIU/mL Final     Last office note: - continue LT4 88mcg and 100mcg alternating  - TFTs q 6 months    Passed per protocol- ordered to pharmacy.

## 2024-10-01 ENCOUNTER — PATIENT MESSAGE (OUTPATIENT)
Facility: CLINIC | Age: 29
End: 2024-10-01

## 2024-10-08 ENCOUNTER — TELEPHONE (OUTPATIENT)
Dept: INTERNAL MEDICINE CLINIC | Facility: CLINIC | Age: 29
End: 2024-10-08

## 2024-10-08 NOTE — TELEPHONE ENCOUNTER
Patient is set up for a pre-surgical office visit on 12/4/2024.     Surgeon: Dr. Chrissy Wade    Date of Surgery: 12/9/2024    Scrotum Surgery    Forms in red folder.

## 2024-10-08 NOTE — TELEPHONE ENCOUNTER
Patient states that a letter was completed by Dr. Adam Schriedel earlier this year for scrotum surgery for his insurance company.  Patient states that he needs an annual letter for the surgery to be covered. Patient states the one previously sent can be used again with the date updated.     Ph. [phone]  Fax. [phone]     Patient requests to have this letter sent to [place]/ Dr. Chrissy Wade.

## 2024-10-08 NOTE — TELEPHONE ENCOUNTER
Pt called requesting lab orders be faxed to [facility] lab facility. Order printed and faxed to [phone].

## 2024-10-08 NOTE — TELEPHONE ENCOUNTER
Dr. Schriedel, ok for same letter? Appears this was written on 6/21/23, but will confirm with pt this is the letter they are needing prior to sending.

## 2024-10-10 DIAGNOSIS — Z78.9 FEMALE-TO-MALE TRANSGENDER PERSON: Primary | ICD-10-CM

## 2024-10-10 DIAGNOSIS — Z51.81 ENCOUNTER FOR MONITORING TESTOSTERONE REPLACEMENT THERAPY: ICD-10-CM

## 2024-10-10 DIAGNOSIS — Z79.890 ENCOUNTER FOR MONITORING TESTOSTERONE REPLACEMENT THERAPY: ICD-10-CM

## 2024-10-10 RX ORDER — NEEDLES, FILTER 19GX1 1/2"
NEEDLE, DISPOSABLE MISCELLANEOUS
Qty: 12 EACH | Refills: 1 | Status: SHIPPED | OUTPATIENT
Start: 2024-10-10

## 2024-10-10 NOTE — TELEPHONE ENCOUNTER
Endocrine Refill protocol for Glucose testing supplies     Protocol Criteria: PASSED Reason: N/A    If below requirement is met, send a 90-day supply with 1 refill per provider protocol.    Verify appointment with Endocrinology completed in the last 6 months or scheduled in the next 3 months.    Last completed office visit: 6/28/2024 Mary Cool DO   Next scheduled Follow up:   Future Appointments   Date Time Provider Department Center   10/14/2024  4:00 PM Mary Cool DO [id number] [place]   12/4/2024  8:30 AM Schriedel, Adam, MD [facility] [facility]       Refill passed and ordered per protocol.

## 2024-10-14 ENCOUNTER — TELEMEDICINE (OUTPATIENT)
Facility: CLINIC | Age: 29
End: 2024-10-14
Payer: COMMERCIAL

## 2024-10-14 DIAGNOSIS — E03.8 HYPOTHYROIDISM, SECONDARY: ICD-10-CM

## 2024-10-14 DIAGNOSIS — Z78.9 FEMALE-TO-MALE TRANSGENDER PERSON: Primary | ICD-10-CM

## 2024-10-14 NOTE — PATIENT INSTRUCTIONS
Decrease your testosterone to 0.4cc per week.   Repeat your labs fasting in the morning in about 3 months (mid January).     General follow up information:  Please let us know if you require any refills at least 1 week prior to your medication running out. If you do run out of medication, please call our office ASAP to request refills (do not wait until your follow up).  Please call us if you experience any problems with insurance coverage of medication, lab work, or imaging.   The on-call pager is for urgent matters only. If you are a type 1 diabetic and run out of insulin after business hours 8AM-4PM, you may call the on-call pager for a refill to a 24 hour pharmacy. If you have adrenal insufficiency and run out of steroids, you may call the on-call pager for a refill to a 24 hour pharmacy. All other refill requests should be requested during business hours.    Return Visit   [] Dr. Cool in end of January  [] Virtual visit if preferred
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
## 2024-10-28 DIAGNOSIS — E03.9 HYPOTHYROIDISM (ACQUIRED): ICD-10-CM

## 2024-10-29 RX ORDER — LEVOTHYROXINE SODIUM 100 UG/1
TABLET ORAL
Qty: 12 TABLET | Refills: 0 | Status: SHIPPED | OUTPATIENT
Start: 2024-10-29

## 2024-10-29 NOTE — TELEPHONE ENCOUNTER
Endocrine refill protocol for medications for hypothyroidism and hyperthyroidism    Protocol Criteria:  PASSED Reason: N/A    If all below requirements are met, send a 90-day supply with 1 refill per provider protocol.    Verify appointment with Endocrinology completed in the last 12 months or scheduled in the next 6 months.    Normal TSH result in the past 12 months   Review recent telephone encounters and mychart communications with patient to ensure a dose change has not occurred since last office visit that was not updated in the medication history list     Last completed office visit:10/14/2024 Mary Cool,    Next scheduled Follow up:   Future Appointments   Date Time Provider Department Center   12/4/2024  8:30 AM Schriedel, Adam, MD [facility]      Last TSH result:   TSH   Date Value Ref Range Status   06/27/2024 0.793 0.358 - 3.740 mIU/mL Final     Comment:     This test may exhibit interference when a sample is collected from a person who is consuming high dose of biotin (a.k.a., vitamin B7, vitamin H, coenzyme R) supplements resulting in serum concentrations >100 ng/mL.  Intake of the recommended daily allowance (RDA) for biotin (0.03 mg) has not been shown to typically cause significant interference; however, high dose daily dietary supplements may contain biotin concentrations greater than 150 times (5-10 mg) the RDA.  It is recommended that physicians ask all patients who may be on biotin supplementation to stop biotin consumption at least 72 hours prior to collection of a new sample.     07/07/2014 0.484 0.350 - 5.500 mIU/mL Final     Last office note: - continue LT4 88mcg and 100mcg alternating  - TFTs q 6 months, repeat with next labs    Passed and ordered per protocol.

## 2024-11-04 ENCOUNTER — PATIENT MESSAGE (OUTPATIENT)
Dept: INTERNAL MEDICINE CLINIC | Facility: CLINIC | Age: 29
End: 2024-11-04

## 2024-11-07 NOTE — TELEPHONE ENCOUNTER
After consult with Dr. Lee, surgical history has been updated.  Care Gap has been modified to exclude PAP smear based on gender assignment surgeries.

## 2024-11-22 ENCOUNTER — LAB ENCOUNTER (OUTPATIENT)
Dept: LAB | Age: 29
End: 2024-11-22
Attending: INTERNAL MEDICINE
Payer: COMMERCIAL

## 2024-11-22 ENCOUNTER — OFFICE VISIT (OUTPATIENT)
Dept: INTERNAL MEDICINE CLINIC | Facility: CLINIC | Age: 29
End: 2024-11-22
Payer: COMMERCIAL

## 2024-11-22 VITALS
OXYGEN SATURATION: 98 % | WEIGHT: 230 LBS | BODY MASS INDEX: 38.32 KG/M2 | HEIGHT: 65 IN | HEART RATE: 110 BPM | DIASTOLIC BLOOD PRESSURE: 80 MMHG | RESPIRATION RATE: 18 BRPM | TEMPERATURE: 98 F | SYSTOLIC BLOOD PRESSURE: 120 MMHG

## 2024-11-22 DIAGNOSIS — J45.20 MILD INTERMITTENT EXTRINSIC ASTHMA WITHOUT COMPLICATION (HCC): ICD-10-CM

## 2024-11-22 DIAGNOSIS — Z01.818 PRE-OP EVALUATION: ICD-10-CM

## 2024-11-22 DIAGNOSIS — Z01.818 PRE-OP EVALUATION: Primary | ICD-10-CM

## 2024-11-22 DIAGNOSIS — E03.8 HYPOTHYROIDISM, SECONDARY: ICD-10-CM

## 2024-11-22 DIAGNOSIS — K59.03 DRUG-INDUCED CONSTIPATION: ICD-10-CM

## 2024-11-22 LAB
ALBUMIN SERPL-MCNC: 4.7 G/DL (ref 3.2–4.8)
ALBUMIN/GLOB SERPL: 1.7 {RATIO} (ref 1–2)
ALP LIVER SERPL-CCNC: 88 U/L
ALT SERPL-CCNC: 10 U/L
ANION GAP SERPL CALC-SCNC: 6 MMOL/L (ref 0–18)
AST SERPL-CCNC: 21 U/L (ref ?–34)
BASOPHILS # BLD AUTO: 0.03 X10(3) UL (ref 0–0.2)
BASOPHILS NFR BLD AUTO: 0.4 %
BILIRUB SERPL-MCNC: 0.6 MG/DL (ref 0.3–1.2)
BILIRUB UR QL STRIP.AUTO: NEGATIVE
BUN BLD-MCNC: 11 MG/DL (ref 9–23)
CALCIUM BLD-MCNC: 9.8 MG/DL (ref 8.7–10.4)
CHLORIDE SERPL-SCNC: 103 MMOL/L (ref 98–112)
CLARITY UR REFRACT.AUTO: CLEAR
CO2 SERPL-SCNC: 31 MMOL/L (ref 21–32)
CREAT BLD-MCNC: 1.13 MG/DL
EGFRCR SERPLBLD CKD-EPI 2021: 89 ML/MIN/1.73M2 (ref 60–?)
EOSINOPHIL # BLD AUTO: 0.05 X10(3) UL (ref 0–0.7)
EOSINOPHIL NFR BLD AUTO: 0.7 %
ERYTHROCYTE [DISTWIDTH] IN BLOOD BY AUTOMATED COUNT: 12.9 %
FASTING STATUS PATIENT QL REPORTED: NO
GLOBULIN PLAS-MCNC: 2.7 G/DL (ref 2–3.5)
GLUCOSE BLD-MCNC: 59 MG/DL (ref 70–99)
GLUCOSE UR STRIP.AUTO-MCNC: NORMAL MG/DL
HCT VFR BLD AUTO: 47.1 %
HGB BLD-MCNC: 16 G/DL
IMM GRANULOCYTES # BLD AUTO: 0.03 X10(3) UL (ref 0–1)
IMM GRANULOCYTES NFR BLD: 0.4 %
KETONES UR STRIP.AUTO-MCNC: NEGATIVE MG/DL
LEUKOCYTE ESTERASE UR QL STRIP.AUTO: 75
LYMPHOCYTES # BLD AUTO: 2.02 X10(3) UL (ref 1–4)
LYMPHOCYTES NFR BLD AUTO: 28.2 %
MCH RBC QN AUTO: 29.1 PG (ref 26–34)
MCHC RBC AUTO-ENTMCNC: 34 G/DL (ref 31–37)
MCV RBC AUTO: 85.8 FL
MONOCYTES # BLD AUTO: 0.55 X10(3) UL (ref 0.1–1)
MONOCYTES NFR BLD AUTO: 7.7 %
NEUTROPHILS # BLD AUTO: 4.49 X10 (3) UL (ref 1.5–7.7)
NEUTROPHILS # BLD AUTO: 4.49 X10(3) UL (ref 1.5–7.7)
NEUTROPHILS NFR BLD AUTO: 62.6 %
NITRITE UR QL STRIP.AUTO: NEGATIVE
OSMOLALITY SERPL CALC.SUM OF ELEC: 287 MOSM/KG (ref 275–295)
PH UR STRIP.AUTO: 7 [PH] (ref 5–8)
PLATELET # BLD AUTO: 202 10(3)UL (ref 150–450)
POTASSIUM SERPL-SCNC: 3.6 MMOL/L (ref 3.5–5.1)
PROT SERPL-MCNC: 7.4 G/DL (ref 5.7–8.2)
PROT UR STRIP.AUTO-MCNC: NEGATIVE MG/DL
RBC # BLD AUTO: 5.49 X10(6)UL
RBC UR QL AUTO: NEGATIVE
SODIUM SERPL-SCNC: 140 MMOL/L (ref 136–145)
SP GR UR STRIP.AUTO: 1.01 (ref 1–1.03)
UROBILINOGEN UR STRIP.AUTO-MCNC: NORMAL MG/DL
WBC # BLD AUTO: 7.2 X10(3) UL (ref 4–11)

## 2024-11-22 PROCEDURE — 85025 COMPLETE CBC W/AUTO DIFF WBC: CPT | Performed by: INTERNAL MEDICINE

## 2024-11-22 PROCEDURE — 3008F BODY MASS INDEX DOCD: CPT | Performed by: INTERNAL MEDICINE

## 2024-11-22 PROCEDURE — 99214 OFFICE O/P EST MOD 30 MIN: CPT | Performed by: INTERNAL MEDICINE

## 2024-11-22 PROCEDURE — 80053 COMPREHEN METABOLIC PANEL: CPT | Performed by: INTERNAL MEDICINE

## 2024-11-22 PROCEDURE — 3074F SYST BP LT 130 MM HG: CPT | Performed by: INTERNAL MEDICINE

## 2024-11-22 PROCEDURE — 3079F DIAST BP 80-89 MM HG: CPT | Performed by: INTERNAL MEDICINE

## 2024-11-22 PROCEDURE — 87086 URINE CULTURE/COLONY COUNT: CPT | Performed by: INTERNAL MEDICINE

## 2024-11-22 PROCEDURE — 81001 URINALYSIS AUTO W/SCOPE: CPT | Performed by: INTERNAL MEDICINE

## 2024-11-22 RX ORDER — HALOPERIDOL 1 MG/1
1 TABLET ORAL 2 TIMES DAILY
COMMUNITY
Start: 2024-11-01

## 2024-11-22 RX ORDER — BUPROPION HCL 150 MG
150 TABLET,SUSTAINED-RELEASE 12 HR ORAL DAILY
COMMUNITY
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
## 2024-11-29 DIAGNOSIS — Z78.9 FEMALE-TO-MALE TRANSGENDER PERSON: ICD-10-CM

## 2024-11-29 RX ORDER — FINASTERIDE 5 MG/1
5 TABLET, FILM COATED ORAL DAILY
Qty: 90 TABLET | Refills: 1 | Status: SHIPPED | OUTPATIENT
Start: 2024-11-29

## 2024-11-29 NOTE — TELEPHONE ENCOUNTER
LOV:10/14    RTC: 3 months    FU: not scheduled    Last Refill: 10/28    Month Supply Pendin days    Last office note:  - Continue finasteride 5mg daily      Routed for review

## 2025-01-03 DIAGNOSIS — E03.9 HYPOTHYROIDISM (ACQUIRED): ICD-10-CM

## 2025-01-03 RX ORDER — LEVOTHYROXINE SODIUM 100 UG/1
TABLET ORAL
Qty: 12 TABLET | Refills: 0 | Status: SHIPPED | OUTPATIENT
Start: 2025-01-03

## 2025-01-03 NOTE — TELEPHONE ENCOUNTER
Endocrine refill protocol for medications for hypothyroidism and hyperthyroidism    Protocol Criteria:  PASSED Reason: N/A    If all below requirements are met, send a 90-day supply with 1 refill per provider protocol.    Verify appointment with Endocrinology completed in the last 12 months or scheduled in the next 6 months.    Normal TSH result in the past 12 months   Review recent telephone encounters and mychart communications with patient to ensure a dose change has not occurred since last office visit that was not updated in the medication history list     Last completed office visit:10/14/2024 Mary Cool,    Next scheduled Follow up:   Future Appointments   Date Time Provider Department Center   1/24/2025  8:30 AM Schriedel, Adam, MD [facility]      Last TSH result:   TSH   Date Value Ref Range Status   06/27/2024 0.793 0.358 - 3.740 mIU/mL Final     Comment:     This test may exhibit interference when a sample is collected from a person who is consuming high dose of biotin (a.k.a., vitamin B7, vitamin H, coenzyme R) supplements resulting in serum concentrations >100 ng/mL.  Intake of the recommended daily allowance (RDA) for biotin (0.03 mg) has not been shown to typically cause significant interference; however, high dose daily dietary supplements may contain biotin concentrations greater than 150 times (5-10 mg) the RDA.  It is recommended that physicians ask all patients who may be on biotin supplementation to stop biotin consumption at least 72 hours prior to collection of a new sample.     07/07/2014 0.484 0.350 - 5.500 mIU/mL Final     Last office note: - continue LT4 88mcg and 100mcg alternating  - TFTs q 6 months, repeat with next labs    Passed and sent per protocol

## 2025-02-06 ENCOUNTER — OFFICE VISIT (OUTPATIENT)
Dept: INTERNAL MEDICINE CLINIC | Facility: CLINIC | Age: 30
End: 2025-02-06
Payer: OTHER MISCELLANEOUS

## 2025-02-06 VITALS
WEIGHT: 228 LBS | SYSTOLIC BLOOD PRESSURE: 128 MMHG | HEIGHT: 65 IN | HEART RATE: 100 BPM | DIASTOLIC BLOOD PRESSURE: 70 MMHG | TEMPERATURE: 98 F | RESPIRATION RATE: 19 BRPM | BODY MASS INDEX: 37.99 KG/M2 | OXYGEN SATURATION: 98 %

## 2025-02-06 DIAGNOSIS — S09.90XD INJURY OF HEAD, SUBSEQUENT ENCOUNTER: Primary | ICD-10-CM

## 2025-02-06 RX ORDER — CYCLOBENZAPRINE HCL 5 MG
5 TABLET ORAL NIGHTLY
Qty: 10 TABLET | Refills: 0 | Status: SHIPPED | OUTPATIENT
Start: 2025-02-06 | End: 2025-02-16
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
## 2025-02-09 DIAGNOSIS — E03.9 HYPOTHYROIDISM (ACQUIRED): ICD-10-CM

## 2025-02-11 RX ORDER — LEVOTHYROXINE SODIUM 88 UG/1
TABLET ORAL
Qty: 48 TABLET | Refills: 0 | OUTPATIENT
Start: 2025-02-11

## 2025-02-11 RX ORDER — LEVOTHYROXINE SODIUM 100 UG/1
TABLET ORAL
Qty: 12 TABLET | Refills: 0 | OUTPATIENT
Start: 2025-02-11

## 2025-02-11 NOTE — TELEPHONE ENCOUNTER
Endocrine refill protocol for medications for hypothyroidism and hyperthyroidism    Protocol Criteria:  FAILED Reason: No labs completed in required time frame    If all below requirements are met, send a 90-day supply with 1 refill per provider protocol.    Verify appointment with Endocrinology completed in the last 12 months or scheduled in the next 6 months.    Normal TSH result in the past 12 months   Review recent telephone encounters and mychart communications with patient to ensure a dose change has not occurred since last office visit that was not updated in the medication history list     Last completed office visit:10/14/2024 Mary Cool DO   Next scheduled Follow up:   Future Appointments   Date Time Provider Department Center   3/28/2025 11:30 AM Schriedel, Adam, MD [facility]      Last TSH result:   TSH   Date Value Ref Range Status   06/27/2024 0.793 0.358 - 3.740 mIU/mL Final     Comment:     This test may exhibit interference when a sample is collected from a person who is consuming high dose of biotin (a.k.a., vitamin B7, vitamin H, coenzyme R) supplements resulting in serum concentrations >100 ng/mL.  Intake of the recommended daily allowance (RDA) for biotin (0.03 mg) has not been shown to typically cause significant interference; however, high dose daily dietary supplements may contain biotin concentrations greater than 150 times (5-10 mg) the RDA.  It is recommended that physicians ask all patients who may be on biotin supplementation to stop biotin consumption at least 72 hours prior to collection of a new sample.     07/07/2014 0.484 0.350 - 5.500 mIU/mL Final

## 2025-02-16 DIAGNOSIS — E03.9 HYPOTHYROIDISM (ACQUIRED): ICD-10-CM

## 2025-02-17 RX ORDER — LEVOTHYROXINE SODIUM 88 UG/1
TABLET ORAL
Qty: 48 TABLET | Refills: 0 | OUTPATIENT
Start: 2025-02-17

## 2025-02-17 RX ORDER — LEVOTHYROXINE SODIUM 100 UG/1
TABLET ORAL
Qty: 12 TABLET | Refills: 0 | OUTPATIENT
Start: 2025-02-17

## 2025-02-17 NOTE — TELEPHONE ENCOUNTER
Endocrine refill protocol for medications for hypothyroidism and hyperthyroidism    Protocol Criteria:  FAILED Reason: No labs completed in required time frame    If all below requirements are met, send a 90-day supply with 1 refill per provider protocol.    Verify appointment with Endocrinology completed in the last 12 months or scheduled in the next 6 months.    Normal TSH result in the past 12 months   Review recent telephone encounters and mychart communications with patient to ensure a dose change has not occurred since last office visit that was not updated in the medication history list     Last completed office visit:10/14/2024 Mary Cool,    Next scheduled Follow up:   Future Appointments   Date Time Provider Department Center   3/28/2025 11:30 AM Schriedel, Adam, MD [facility]      Last TSH result:   TSH   Date Value Ref Range Status   06/27/2024 0.793 0.358 - 3.740 mIU/mL Final     Comment:     This test may exhibit interference when a sample is collected from a person who is consuming high dose of biotin (a.k.a., vitamin B7, vitamin H, coenzyme R) supplements resulting in serum concentrations >100 ng/mL.  Intake of the recommended daily allowance (RDA) for biotin (0.03 mg) has not been shown to typically cause significant interference; however, high dose daily dietary supplements may contain biotin concentrations greater than 150 times (5-10 mg) the RDA.  It is recommended that physicians ask all patients who may be on biotin supplementation to stop biotin consumption at least 72 hours prior to collection of a new sample.     07/07/2014 0.484 0.350 - 5.500 mIU/mL Final     Need updated labs and follow up scheduled.  Denied script for now, my chart message sent.

## 2025-03-06 ENCOUNTER — TELEPHONE (OUTPATIENT)
Facility: CLINIC | Age: 30
End: 2025-03-06

## 2025-03-06 NOTE — TELEPHONE ENCOUNTER
Fax from [facility]    Medical records     Place in providers Inbox for review

## 2025-03-14 ENCOUNTER — MED REC SCAN ONLY (OUTPATIENT)
Dept: INTERNAL MEDICINE CLINIC | Facility: CLINIC | Age: 30
End: 2025-03-14

## 2025-03-14 NOTE — PROGRESS NOTES
Received Post OP Notes [facility]. Reviewed by Dr Puente placed in medical scanning bin

## 2025-03-28 ENCOUNTER — PATIENT MESSAGE (OUTPATIENT)
Dept: INTERNAL MEDICINE CLINIC | Facility: CLINIC | Age: 30
End: 2025-03-28

## 2025-03-28 ENCOUNTER — OFFICE VISIT (OUTPATIENT)
Dept: INTERNAL MEDICINE CLINIC | Facility: CLINIC | Age: 30
End: 2025-03-28
Payer: COMMERCIAL

## 2025-03-28 VITALS
BODY MASS INDEX: 38.12 KG/M2 | HEART RATE: 94 BPM | OXYGEN SATURATION: 97 % | SYSTOLIC BLOOD PRESSURE: 126 MMHG | TEMPERATURE: 97 F | HEIGHT: 65 IN | DIASTOLIC BLOOD PRESSURE: 78 MMHG | WEIGHT: 228.81 LBS

## 2025-03-28 DIAGNOSIS — Z87.890 STATUS POST GENDER REASSIGNMENT SURGERY: ICD-10-CM

## 2025-03-28 DIAGNOSIS — T78.40XA ALLERGIC REACTION, INITIAL ENCOUNTER: ICD-10-CM

## 2025-03-28 DIAGNOSIS — F64.0 TRANSGENDER PERSON ON HORMONE THERAPY: ICD-10-CM

## 2025-03-28 DIAGNOSIS — F64.9 GENDER DYSPHORIA: ICD-10-CM

## 2025-03-28 DIAGNOSIS — J98.01 BRONCHOSPASM: Primary | ICD-10-CM

## 2025-03-28 DIAGNOSIS — F45.22 BODY DYSMORPHIC DISORDER: ICD-10-CM

## 2025-03-28 DIAGNOSIS — F33.2 MAJOR DEPRESSIVE DISORDER, RECURRENT SEVERE WITHOUT PSYCHOTIC FEATURES (HCC): ICD-10-CM

## 2025-03-28 DIAGNOSIS — Z79.899 TRANSGENDER PERSON ON HORMONE THERAPY: ICD-10-CM

## 2025-03-28 DIAGNOSIS — K59.03 DRUG-INDUCED CONSTIPATION: ICD-10-CM

## 2025-03-28 DIAGNOSIS — J34.89 NASAL DRYNESS: ICD-10-CM

## 2025-03-28 DIAGNOSIS — J45.20 MILD INTERMITTENT EXTRINSIC ASTHMA WITHOUT COMPLICATION (HCC): ICD-10-CM

## 2025-03-28 DIAGNOSIS — Z87.890 STATUS POST GENDER AFFIRMATION SURGERY: ICD-10-CM

## 2025-03-28 PROCEDURE — 3078F DIAST BP <80 MM HG: CPT | Performed by: INTERNAL MEDICINE

## 2025-03-28 PROCEDURE — 3074F SYST BP LT 130 MM HG: CPT | Performed by: INTERNAL MEDICINE

## 2025-03-28 PROCEDURE — 99214 OFFICE O/P EST MOD 30 MIN: CPT | Performed by: INTERNAL MEDICINE

## 2025-03-28 PROCEDURE — 3008F BODY MASS INDEX DOCD: CPT | Performed by: INTERNAL MEDICINE

## 2025-03-28 RX ORDER — OXYCODONE HYDROCHLORIDE 5 MG/1
5 TABLET ORAL EVERY 4 HOURS PRN
COMMUNITY
Start: 2025-03-24

## 2025-03-28 RX ORDER — SULFAMETHOXAZOLE AND TRIMETHOPRIM 800; 160 MG/1; MG/1
1 TABLET ORAL EVERY 12 HOURS
COMMUNITY
Start: 2025-03-24

## 2025-03-28 RX ORDER — BREXPIPRAZOLE 1 MG/1
TABLET ORAL
COMMUNITY
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
## 2025-03-30 PROBLEM — F64.0 TRANSGENDER PERSON ON HORMONE THERAPY: Status: ACTIVE | Noted: 2025-03-30

## 2025-03-30 PROBLEM — Z79.899 TRANSGENDER PERSON ON HORMONE THERAPY: Status: ACTIVE | Noted: 2025-03-30

## 2025-03-31 ENCOUNTER — TELEPHONE (OUTPATIENT)
Dept: INTERNAL MEDICINE CLINIC | Facility: CLINIC | Age: 30
End: 2025-03-31

## 2025-03-31 DIAGNOSIS — I27.21 HIGH PULMONARY ARTERIAL PRESSURE (HCC): ICD-10-CM

## 2025-03-31 DIAGNOSIS — R06.83 SNORING: ICD-10-CM

## 2025-03-31 DIAGNOSIS — I27.20 PULMONARY HTN (HCC): Primary | ICD-10-CM

## 2025-03-31 NOTE — TELEPHONE ENCOUNTER
Received [facility] Post Op note from 3/28/2025. Placed In Dr Schriedel bin for review.

## 2025-03-31 NOTE — TELEPHONE ENCOUNTER
Patient requesting referral to cardiology per anesthesiology note during surgery. (See attached). Do you think it is ok to wait until mid June? Patient has no PTO time.

## 2025-04-07 ENCOUNTER — MED REC SCAN ONLY (OUTPATIENT)
Dept: INTERNAL MEDICINE CLINIC | Facility: CLINIC | Age: 30
End: 2025-04-07

## 2025-04-19 ENCOUNTER — HOSPITAL ENCOUNTER (OUTPATIENT)
Dept: CV DIAGNOSTICS | Facility: HOSPITAL | Age: 30
Discharge: HOME | End: 2025-04-19
Attending: INTERNAL MEDICINE
Payer: COMMERCIAL

## 2025-04-19 DIAGNOSIS — I27.21 HIGH PULMONARY ARTERIAL PRESSURE (HCC): ICD-10-CM

## 2025-04-19 PROCEDURE — 93306 TTE W/DOPPLER COMPLETE: CPT | Performed by: INTERNAL MEDICINE

## 2025-05-07 ENCOUNTER — TELEPHONE (OUTPATIENT)
Dept: INTERNAL MEDICINE CLINIC | Facility: CLINIC | Age: 30
End: 2025-05-07

## 2025-05-07 ENCOUNTER — OFFICE VISIT (OUTPATIENT)
Facility: CLINIC | Age: 30
End: 2025-05-07
Payer: COMMERCIAL

## 2025-05-07 VITALS
HEART RATE: 81 BPM | OXYGEN SATURATION: 98 % | WEIGHT: 228 LBS | RESPIRATION RATE: 16 BRPM | HEIGHT: 65 IN | BODY MASS INDEX: 37.99 KG/M2

## 2025-05-07 DIAGNOSIS — G47.33 OSA (OBSTRUCTIVE SLEEP APNEA): ICD-10-CM

## 2025-05-07 DIAGNOSIS — J45.20 MILD INTERMITTENT EXTRINSIC ASTHMA WITHOUT COMPLICATION (HCC): Primary | ICD-10-CM

## 2025-05-07 PROCEDURE — 3008F BODY MASS INDEX DOCD: CPT | Performed by: INTERNAL MEDICINE

## 2025-05-07 PROCEDURE — 99204 OFFICE O/P NEW MOD 45 MIN: CPT | Performed by: INTERNAL MEDICINE

## 2025-05-07 NOTE — TELEPHONE ENCOUNTER
Received a medical record request fax today from [facility].  Sent to [employer] and [employer]

## 2025-05-07 NOTE — PROGRESS NOTES
The following individual(s) verbally consented to be recorded using ambient AI listening technology and understand that they can each withdraw their consent to this listening technology at any point by asking the clinician to turn off or pause the recording:    Patient name: Evan Vyas Christa  Additional names:    Yes
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
## 2025-05-14 ENCOUNTER — NURSE TRIAGE (OUTPATIENT)
Dept: INTERNAL MEDICINE CLINIC | Facility: CLINIC | Age: 30
End: 2025-05-14

## 2025-05-14 NOTE — TELEPHONE ENCOUNTER
Action Requested: Summary for Provider     []  Critical Lab, Recommendations Needed  [] Need Additional Advice  []   FYI    []   Need Orders  [] Need Medications Sent to Pharmacy  []  Other     SUMMARY: Patient call for advise for pressure between eyebrows x3-4 days. Believes it is a sinus infection. Denies fever, no cough, nasal drainage is clear, endorses body aches. Offered an appointment for tomorrow however has schedule conflicts. Advised on home care remedies and states he will try those first and if not better will call tomorrow and schedule an appointment for Friday.    Reason for call: Sinusitis  Onset: 3-4 days          Reason for Disposition   Sinus pain (not just congestion) and fever   Sinus congestion as part of a cold, present < 10 days    Protocols used: Sinus Pain and Congestion-A-[place]

## 2025-05-17 ENCOUNTER — RT VISIT (OUTPATIENT)
Dept: RESPIRATORY THERAPY | Facility: HOSPITAL | Age: 30
End: 2025-05-17
Attending: INTERNAL MEDICINE
Payer: COMMERCIAL

## 2025-05-17 ENCOUNTER — LAB ENCOUNTER (OUTPATIENT)
Dept: LAB | Facility: HOSPITAL | Age: 30
End: 2025-05-17
Attending: INTERNAL MEDICINE
Payer: COMMERCIAL

## 2025-05-17 DIAGNOSIS — J45.20 MILD INTERMITTENT EXTRINSIC ASTHMA WITHOUT COMPLICATION (HCC): ICD-10-CM

## 2025-05-17 LAB
BASOPHILS # BLD AUTO: 0.02 X10(3) UL (ref 0–0.2)
BASOPHILS NFR BLD AUTO: 0.4 %
EOSINOPHIL # BLD AUTO: 0.06 X10(3) UL (ref 0–0.7)
EOSINOPHIL NFR BLD AUTO: 1.3 %
ERYTHROCYTE [DISTWIDTH] IN BLOOD BY AUTOMATED COUNT: 11.9 %
HCT VFR BLD AUTO: 39.1 %
HGB BLD-MCNC: 13.7 G/DL (ref 7–20)
IMM GRANULOCYTES # BLD AUTO: 0.01 X10(3) UL (ref 0–1)
IMM GRANULOCYTES NFR BLD: 0.2 %
LYMPHOCYTES # BLD AUTO: 1.34 X10(3) UL (ref 1–4)
LYMPHOCYTES NFR BLD AUTO: 29.2 %
MCH RBC QN AUTO: 28.3 PG (ref 26–34)
MCHC RBC AUTO-ENTMCNC: 35 G/DL (ref 31–37)
MCV RBC AUTO: 80.8 FL
MONOCYTES # BLD AUTO: 0.42 X10(3) UL (ref 0.1–1)
MONOCYTES NFR BLD AUTO: 9.2 %
NEUTROPHILS # BLD AUTO: 2.74 X10 (3) UL (ref 1.5–7.7)
NEUTROPHILS # BLD AUTO: 2.74 X10(3) UL (ref 1.5–7.7)
NEUTROPHILS NFR BLD AUTO: 59.7 %
PLATELET # BLD AUTO: 164 10(3)UL (ref 150–450)
RBC # BLD AUTO: 4.84 X10(6)UL
WBC # BLD AUTO: 4.6 X10(3) UL (ref 4–11)

## 2025-05-17 PROCEDURE — 82785 ASSAY OF IGE: CPT

## 2025-05-17 PROCEDURE — 86003 ALLG SPEC IGE CRUDE XTRC EA: CPT

## 2025-05-17 PROCEDURE — 85025 COMPLETE CBC W/AUTO DIFF WBC: CPT

## 2025-05-17 PROCEDURE — 36415 COLL VENOUS BLD VENIPUNCTURE: CPT

## 2025-05-19 ENCOUNTER — TELEPHONE (OUTPATIENT)
Dept: INTERNAL MEDICINE CLINIC | Facility: CLINIC | Age: 30
End: 2025-05-19

## 2025-05-19 DIAGNOSIS — E03.9 HYPOTHYROIDISM (ACQUIRED): ICD-10-CM

## 2025-05-19 NOTE — TELEPHONE ENCOUNTER
Received a medical record request fax today from [facility].  Sent to [facility]

## 2025-05-20 LAB

## 2025-05-20 RX ORDER — LEVOTHYROXINE SODIUM 88 UG/1
TABLET ORAL
Qty: 48 TABLET | Refills: 0 | Status: SHIPPED | OUTPATIENT
Start: 2025-05-20

## 2025-05-20 RX ORDER — LEVOTHYROXINE SODIUM 100 UG/1
TABLET ORAL
Qty: 36 TABLET | Refills: 0 | Status: SHIPPED | OUTPATIENT
Start: 2025-05-20

## 2025-05-20 NOTE — TELEPHONE ENCOUNTER
Endocrine refill protocol for medications for hypothyroidism and hyperthyroidism    Protocol Criteria:  PASSED Reason: N/A    If all below requirements are met, send a 90-day supply with 1 refill per provider protocol.    Verify appointment with Endocrinology completed in the last 12 months or scheduled in the next 6 months.    Normal TSH result in the past 12 months   Review recent telephone encounters and mychart communications with patient to ensure a dose change has not occurred since last office visit that was not updated in the medication history list     Last completed office visit:6/28/2024 Mary Cool DO   Last completed telemed visit: 10/14/2024 Mary Cool DO  Next scheduled Follow up:   Future Appointments   Date Time Provider Department Center   6/6/2025  7:00 PM  SLEEP ROOMS [facility] [place]   7/25/2025 11:00 AM Schriedel, Adam, MD [facility]      Last TSH result:   TSH   Date Value Ref Range Status   06/27/2024 0.793 0.358 - 3.740 mIU/mL Final     Comment:     This test may exhibit interference when a sample is collected from a person who is consuming high dose of biotin (a.k.a., vitamin B7, vitamin H, coenzyme R) supplements resulting in serum concentrations >100 ng/mL.  Intake of the recommended daily allowance (RDA) for biotin (0.03 mg) has not been shown to typically cause significant interference; however, high dose daily dietary supplements may contain biotin concentrations greater than 150 times (5-10 mg) the RDA.  It is recommended that physicians ask all patients who may be on biotin supplementation to stop biotin consumption at least 72 hours prior to collection of a new sample.     07/07/2014 0.484 0.350 - 5.500 mIU/mL Final

## 2025-05-21 LAB
ALLERGEN BRAZIL NUT: <0.1 KUA/L (ref ?–0.1)
ALMOND IGE QN: <0.1 KUA/L (ref ?–0.1)
CASHEW NUT IGE QN: <0.1 KUA/L (ref ?–0.1)
CLAM IGE QN: <0.1 KUA/L (ref ?–0.1)
CODFISH IGE QN: <0.1 KUA/L (ref ?–0.1)
CORN IGE QN: <0.1 KUA/L (ref ?–0.1)
COW MILK IGE QN: <0.1 KUA/L (ref ?–0.1)
EGG WHITE IGE QN: <0.1 KUA/L (ref ?–0.1)
GLUTEN IGE QN: <0.1 KUA/L (ref ?–0.1)
HAZELNUT IGE QN: <0.1 KUA/L (ref ?–0.1)
IGE SERPL-ACNC: 10.4 KU/L (ref 2–214)
PEANUT IGE QN: <0.1 KUA/L (ref ?–0.1)
SALMON IGE QN: <0.1 KUA/L (ref ?–0.1)
SCALLOP IGE QN: <0.1 KUA/L (ref ?–0.1)
SESAME SEED IGE QN: <0.1 KUA/L (ref ?–0.1)
SHRIMP IGE QN: <0.1 KUA/L (ref ?–0.1)
SOYBEAN IGE QN: <0.1 KUA/L (ref ?–0.1)
WALNUT IGE QN: <0.1 KUA/L (ref ?–0.1)
WHEAT IGE QN: <0.1 KUA/L (ref ?–0.1)

## 2025-05-23 PROCEDURE — 94726 PLETHYSMOGRAPHY LUNG VOLUMES: CPT | Performed by: INTERNAL MEDICINE

## 2025-05-23 PROCEDURE — 94729 DIFFUSING CAPACITY: CPT | Performed by: INTERNAL MEDICINE

## 2025-05-23 PROCEDURE — 94010 BREATHING CAPACITY TEST: CPT | Performed by: INTERNAL MEDICINE

## 2025-05-23 NOTE — PROCEDURES
Findings:  FEV1 is 3.44L, z-score of -0.08.  FVC is 4.00L, z-score of -0.23.  FEV1/ FVC ratio is 0.86.  The flow-volume loop demonstrates a normal pattern.  The TLC is 5.35L, z-score of -0.92.  The residual volume 1.03L, z-score of -0.62.  The diffusion capacity is 22.26 with z-score of -1.60. When corrected for alveolar volume, the diffusion capacity is 4.66 with z-score of -0.65.  Impression:  There is no airway obstruction on spirometry and visualized on flow-volume loop.  Lung volumes are normal.  Diffusion capacity is normal.  There are no previous pulmonary function tests available for comparison.   Of note, this testing was performed in accordance to ATS/ERS interpretation guidelines with the use of upper and lower limits of normal as well as z-score references. Previous testing (before March 2024) was not performed at [facility] using z-scores and so comparison to previous testing should be taken with caution.

## 2025-05-26 DIAGNOSIS — Z78.9 FEMALE-TO-MALE TRANSGENDER PERSON: ICD-10-CM

## 2025-05-26 DIAGNOSIS — Z79.890 ENCOUNTER FOR MONITORING TESTOSTERONE REPLACEMENT THERAPY: ICD-10-CM

## 2025-05-26 DIAGNOSIS — Z51.81 ENCOUNTER FOR MONITORING TESTOSTERONE REPLACEMENT THERAPY: ICD-10-CM

## 2025-05-28 RX ORDER — TESTOSTERONE CYPIONATE 200 MG/ML
INJECTION, SOLUTION INTRAMUSCULAR
Qty: 6 ML | Refills: 0 | Status: SHIPPED | OUTPATIENT
Start: 2025-05-28

## 2025-05-30 DIAGNOSIS — Z79.890 ENCOUNTER FOR MONITORING TESTOSTERONE REPLACEMENT THERAPY: ICD-10-CM

## 2025-05-30 DIAGNOSIS — Z78.9 FEMALE-TO-MALE TRANSGENDER PERSON: ICD-10-CM

## 2025-05-30 DIAGNOSIS — Z51.81 ENCOUNTER FOR MONITORING TESTOSTERONE REPLACEMENT THERAPY: ICD-10-CM

## 2025-05-31 RX ORDER — NEEDLES, FILTER 19GX1 1/2"
NEEDLE, DISPOSABLE MISCELLANEOUS
Qty: 12 EACH | Refills: 1 | Status: SHIPPED | OUTPATIENT
Start: 2025-05-31

## 2025-05-31 NOTE — TELEPHONE ENCOUNTER
Endocrine Refill protocol for Glucose testing supplies     Protocol Criteria: PASSED Reason: N/A    If below requirement is met, send a 90-day supply with 1 refill per provider protocol.    Verify appointment with Endocrinology completed in the last 6 months or scheduled in the next 3 months.    Last completed office visit:6/28/2024 Mary Cool DO   Last completed telemed visit: 10/14/2024 Mary Cool DO  Next scheduled Follow up:   Future Appointments   Date Time Provider Department Center   6/6/2025  7:00 PM  SLEEP ROOMS [facility]   7/25/2025 11:00 AM Schriedel, Adam, MD [facility]

## 2025-06-06 ENCOUNTER — OFFICE VISIT (OUTPATIENT)
Dept: SLEEP CENTER | Age: 30
End: 2025-06-06
Attending: Other
Payer: COMMERCIAL

## 2025-06-06 DIAGNOSIS — G47.33 OSA (OBSTRUCTIVE SLEEP APNEA): ICD-10-CM

## 2025-06-06 PROCEDURE — 95806 SLEEP STUDY UNATT&RESP EFFT: CPT

## 2025-06-28 DIAGNOSIS — Z78.9 FEMALE-TO-MALE TRANSGENDER PERSON: ICD-10-CM

## 2025-06-30 NOTE — TELEPHONE ENCOUNTER
Endocrine Refill protocol for oral medications    Protocol Criteria:  FAILED  Reason: No Visit in required time frame    If below requirement is met, send a 90-day supply with 1 refill per provider protocol.    Verify appointment with Endocrinology completed in the last 6 months or scheduled in the next 3 months.    Last completed office visit:6/28/2024 Mary Cool DO   Last completed telemed visit: 10/14/2024 Mary Cool DO  Next scheduled Follow up:   Future Appointments   Date Time Provider Department Center   7/25/2025 11:00 AM Schriedel, Adam, MD [facility]         Mychart sent for review, awaiting response.

## 2025-07-01 RX ORDER — FINASTERIDE 5 MG/1
5 TABLET, FILM COATED ORAL DAILY
Qty: 90 TABLET | Refills: 0 | Status: SHIPPED | OUTPATIENT
Start: 2025-07-01

## 2025-07-03 ENCOUNTER — SLEEP STUDY (OUTPATIENT)
Facility: CLINIC | Age: 30
End: 2025-07-03

## 2025-07-03 DIAGNOSIS — G47.9 SLEEP DISORDER: Primary | ICD-10-CM

## 2025-07-03 PROCEDURE — 95806 SLEEP STUDY UNATT&RESP EFFT: CPT | Performed by: OTHER

## 2025-07-09 ENCOUNTER — OFFICE VISIT (OUTPATIENT)
Facility: CLINIC | Age: 30
End: 2025-07-09
Payer: COMMERCIAL

## 2025-07-09 VITALS
RESPIRATION RATE: 18 BRPM | BODY MASS INDEX: 38.72 KG/M2 | HEIGHT: 65 IN | OXYGEN SATURATION: 99 % | WEIGHT: 232.38 LBS | DIASTOLIC BLOOD PRESSURE: 88 MMHG | SYSTOLIC BLOOD PRESSURE: 128 MMHG | HEART RATE: 111 BPM

## 2025-07-09 DIAGNOSIS — Z51.81 ENCOUNTER FOR MONITORING TESTOSTERONE REPLACEMENT THERAPY: ICD-10-CM

## 2025-07-09 DIAGNOSIS — Z79.890 ENCOUNTER FOR MONITORING TESTOSTERONE REPLACEMENT THERAPY: ICD-10-CM

## 2025-07-09 DIAGNOSIS — Z78.9 FEMALE-TO-MALE TRANSGENDER PERSON: Primary | ICD-10-CM

## 2025-07-09 DIAGNOSIS — E03.8 HYPOTHYROIDISM, SECONDARY: ICD-10-CM

## 2025-07-09 PROCEDURE — 99215 OFFICE O/P EST HI 40 MIN: CPT | Performed by: STUDENT IN AN ORGANIZED HEALTH CARE EDUCATION/TRAINING PROGRAM

## 2025-07-09 PROCEDURE — 3079F DIAST BP 80-89 MM HG: CPT | Performed by: STUDENT IN AN ORGANIZED HEALTH CARE EDUCATION/TRAINING PROGRAM

## 2025-07-09 PROCEDURE — 3074F SYST BP LT 130 MM HG: CPT | Performed by: STUDENT IN AN ORGANIZED HEALTH CARE EDUCATION/TRAINING PROGRAM

## 2025-07-09 PROCEDURE — 3008F BODY MASS INDEX DOCD: CPT | Performed by: STUDENT IN AN ORGANIZED HEALTH CARE EDUCATION/TRAINING PROGRAM

## 2025-07-09 NOTE — PROGRESS NOTES
The following individual(s) verbally consented to be recorded using ambient AI listening technology and understand that they can each withdraw their consent to this listening technology at any point by asking the clinician to turn off or pause the recording:    Patient name: Evan Vyas Christa  Additional names:

## 2025-07-09 NOTE — PATIENT INSTRUCTIONS
Please do fasting labs midway between your testosterone injections so we can see if anything needs to be adjusted - you can take the orders to anesthesia  Keep us updated for timeline for egg retrieval/fertility conversations.    General follow up information:  Please let us know if you require any refills at least 1 week prior to your medication running out. If you do run out of medication, please call our office ASAP to request refills (do not wait until your follow up).  Please call us if you experience any problems with insurance coverage of medication, lab work, or imaging.   Lab results and imaging will typically be reviewed at follow up appointments, or within 3-5 business days of ALL results being in if you do not have an appointment scheduled in the near future. Our office will contact you for any abnormal results requiring more urgent follow up or action.   The on-call pager is for urgent matters only. If you are a type 1 diabetic and run out of insulin after business hours 8AM-4PM, you may call the on-call pager for a refill to a 24 hour pharmacy. If you have adrenal insufficiency and run out of steroids, you may call the on-call pager for a refill to a 24 hour pharmacy. All other refill requests should be requested during business hours.    Return Visit   [] Dr. Cool in 6 months - please print lab orders for patient
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
## 2025-08-28 DIAGNOSIS — E03.9 HYPOTHYROIDISM (ACQUIRED): ICD-10-CM

## 2025-08-28 RX ORDER — LEVOTHYROXINE SODIUM 100 UG/1
TABLET ORAL
Qty: 36 TABLET | Refills: 0 | Status: SHIPPED | OUTPATIENT
Start: 2025-08-28

## 2025-08-28 RX ORDER — LEVOTHYROXINE SODIUM 88 UG/1
TABLET ORAL
Qty: 48 TABLET | Refills: 0 | Status: SHIPPED | OUTPATIENT
Start: 2025-08-28

## (undated) NOTE — LETTER
ASTHMA ACTION PLAN for Glenroy Tamez     : 1995     Date: 3/31/2023  Provider:  Martin Mckinney MD  Phone for doctor or clinic: [facility], [address] [facility], [address]  [place] [address]  [phone]    ACT Score: 25      You can use the colors of a traffic light to help learn about your asthma medicines. 1. Green - Go! % of Personal Best Peak Flow Use controller medicine. Breathing is good  No cough or wheeze  Can work and play Medicine How much to take When to take it    MONTELUKAST 10 MG Oral Tab  TAKE 1 TABLET BY MOUTH EVERY DAY           2. Yellow - Caution. 50-79% Personal Best Peak  Flow. Use reliever medicine to keep an asthma attack from getting bad. Cough  Wheezing  Tight Chest  Wake up at night Medicine How much to take When to take it    VENTOLIN  (90 Base) MCG/ACT Inhalation Aero Soln  INHALE 2 PUFFS ORALLY EVERY FOUR HOURS AS NEEDED FOR WHEEZING     ALBUTEROL SULFATE (2.5 MG/3ML) 0.083% Inhalation Nebu Soln  USE 1 AMPULE IN NEBULIZER EVERY SIX HOURS AS NEEDED FOR WHEEZING            Additional instructions         3. Red - Stop! Danger!  <50% Personal Best Peak  Flow. Take these medications until  Get help from a doctor   Medicine not helping  Breathing is hard and fast  Nose opens wide  Can't walk  Ribs show  Can't talk well Medicine How much to take When to take it    Go to the nearest Emergency Room/Department right now! Additional Instructions If your symptoms do not improve and you cannot contact your doctor, [facility] or call 911 immediately! [x] Asthma Action Plan reviewed with patient (and caregiver if necessary) and a copy of the plan was given to the patient/caregiver. [] Asthma Action Plan reviewed with patient (and caregiver if necessary) on the phone and mailed copy to patient or submitted via [address]      Signatures:  Provider  Martin Mckinney MD   Patient Caretaker

## (undated) NOTE — LETTER
Dear Patient,  Please note that [facility] (“[facility]”) does not sell hearing aid devices/supplies.  The audiologists that are overseeing your care today are employed at [facility] as well at [employer].  [employer] is an independent audiology company that sells hearing aid devices and supplies.  The company is not owned or affiliated with [facility].  [employer] is located at:  [employer] [facility]  [address] \[facility]", [address]  [place], [place] [address] [phone]  Your audiologist is recommending that you could benefit from a hearing aid device and/or supplies.  If you decide to purchase a hearing aid device or supplies from [employer], the audiologists will receive a financial benefit from this sale.    As a patient and a consumer, you have the option to do your own research to find the most appropriate audiology vendor.  There are local and national audiology vendors that sell comparable products and services.  In addition to [employer], we have provided you with a preliminary list of audiology vendors that will allow you to start the due diligence process.   [employer]  [address] \[facility]", [address]  [place], [place] [address] [phone]  [facility]  [address], [address]  [place], [place] [id number] [phone]  [facility]  [address], [address]  [place], [place] [address] [phone]  *Accepts Public Aid  [facility]  [address]  [place], [place], [address]  [phone]    Please note that your choice of an audiology vendor will not affect the relationship you have with your audiologist in any capacity.

## (undated) NOTE — LETTER
02/25/19    Patient Name:  Benja Briones        To Whom it may concern: Benja Briones was evaluated in the office today and should be excused from attending school on 2/25/19. He may return to school on 2/26/19. Sincerely,     Yolande Frank.  Pearl Barr

## (undated) NOTE — ED AVS SNAPSHOT
[facility] Emergency Department    [place]  [address] [address]    Phone:  [phone]    Fax:  [phone]           Benja Briones   MRN: [id number]    Department:  [facility] Emergency Department   Date of Visit:  2/ IF THERE IS ANY CHANGE OR WORSENING OF YOUR CONDITION, CALL YOUR PRIMARY CARE PHYSICIAN AT ONCE OR RETURN IMMEDIATELY TO THE EMERGENCY DEPARTMENT.     If you have been prescribed any medication(s), please fill your prescription right away and begin taking t

## (undated) NOTE — LETTER
Date: 2/6/2025    Patient Name: Evan Goodwin          To Whom it may concern:    The above patient was seen at [facility] for treatment of a medical condition.    This patient should be excused from attending work through 2/12/25 and may return 2/13/25 with the following limitations: none.        Sincerely,    FRANK Brasher

## (undated) NOTE — ED AVS SNAPSHOT
Bj Bach   MRN: [id number]    Department:  [facility] Emergency Department   Date of Visit:  12/4/2017           Disclosure     Insurance plans vary and the physician(s) referred by the ER may not be covered by your plan.  Please contact y tell this physician (or your personal doctor if your instructions are to return to your personal doctor) about any new or lasting problems. The primary care or specialist physician will see patients referred from the [facility] Emergency Department.  Rosanna Cornelius

## (undated) NOTE — ED AVS SNAPSHOT
[facility] Emergency Department    [facility]  [address] [address]    Phone:  [phone]    Fax:  [phone]           Angela Hernandez   MRN: [id number]    Department:  [facility] Emergency Department   Date of Visit:  2/ Aloe up with your doctor.   Return if feeling suicidal or homicidal.  Follow-up with [facility] is directed    Discharge References/Attachments     DEPRESSION (ENGLISH)      Disclosure     Insurance plans vary and the physician(s) referred by the ER may no CARE PHYSICIAN AT ONCE OR RETURN IMMEDIATELY TO THE EMERGENCY DEPARTMENT.     If you have been prescribed any medication(s), please fill your prescription right away and begin taking the medication(s) as directed    If the emergency physician has read X-ray coverage. Patient [address] is a Federal Navigator program that can help with your Affordable Care Act coverage, as well as all types of Medicaid plans.   To get signed up and covered, please call [phone] and ask to get set up for an insuran

## (undated) NOTE — ED AVS SNAPSHOT
[facility] Emergency Department    [place] [address]    Phone:  [phone]    Fax:  [phone]           Federica Mt   MRN: [id number]    Department:  [facility] Emergency Department   Date of Visit:  5/ [address] [facility])    [address] [facility] [address]  [facility] [address]   [phone]            Jun 07, 2017  7:00 PM   Follow up with Stuart Martell MD   [address] ([address])    [address] [facility] Si usted tiene algun problema con fitch sequimiento, por favor llame a nuestro adminstrador de nba al [phone]    Expect to receive an electronic request (by e-mail or text) to complete a self-assessment the day after your visit.   You may also receiv Goran Jordan [facility] [address] ([address]) [address] ([address] [facility]) [address]  ([address]) [address] [facility] office, you can view your past visit information in [employer] by going to Visits < Visit Summaries. [employer]Mit questions? Call [phone] for help. [employer] is NOT to be used for urgent needs. For medical emergencies, dial 911.

## (undated) NOTE — MR AVS SNAPSHOT
[facility] [address] [address] [phone]               Thank you for choosing us for your health care visit with Saman Downey NP.   We are glad to serve you and happy to provide you with this summary finasteride 5 MG Tabs   Take 5 mg by mouth daily. Commonly known as:  PROSCAR           haloperidol 2 MG Tabs   Take 1 tablet (2 mg total) by mouth 3 (three) times daily.    Commonly known as:  HALDOL           ibuprofen 600 MG Tabs   Take 600 mg by mout Visit [facility] online at  [facility].tn

## (undated) NOTE — LETTER
ASTHMA ACTION PLAN for Paula España     : 1995     Date: 2021  Provider:  Blanka Tariq PA-C  Phone for doctor or clinic: [facility] [place] [address], [address] [facility]  [phone]

## (undated) NOTE — LETTER
06/21/23    Re: Henriette Cranker, 1/20/1995      Dear Surgical Team,    I am witting on behalf of my patient , Rigoberto Hernandez, who uses he pronoun and identifies as he , whom I would like to refer to gender affirming surgery . I have been working with Ollie Kurtz for about 12 years at [employer] Ollie Kurtz remembered knowing he was male since childhood. He began socially transitioning into adulthood by changing his name, pronouns, and physically presenting in a way that is accordance to his gender identity. He has been consistently on hormone therapy for 10 years, and has reported a positive experience. Despite these interventions, he reports significant anxiety, depression, and distress due to his experience of Dysphoria. By my independent evaluation of Ollie Kurtz, I diagnosed him with Gender Dysphoria ( ICD -10 F64.1) for 12 years. Ollie Kurtz has expressed persistent desire for [facility] phalloplasty. Surgery will address his Gender  Dysphoria by allowing him to experience his body in a way that is in accordance with his gender identity. Evan's complete medication list is below. He has a past medical history of obesity, hypothyroidism, asthma, adhd, anxiety, depression, that have all improved and are well managed due to significant lifestyle modifications he has made for improved health, along with continued compliance with medications and appropriate follow up with physicians and care providers. He has no other pertinent past medical history, is not a smoker, and does not partake in any drug use. Ollie Kurtz has demonstrated an understanding of the permanence, costs, recovery time, and possible complications of this surgical gender affirmation surgery and is fully capable of making an informed consent about surgery. Ollie Kurtz is reasonably expected to follow pre- and post-surgical treatment recommendations responsibly. It is my professional opinion that this surgery is medically necessary as stated above.     Please feel free to contact me with any concerns/questions.      Sincerely,         Fabian Sanchez MD

## (undated) NOTE — LETTER
ASTHMA ACTION PLAN for Evan Goodwin     : 1995     Date: 2024  Provider:  Adam Schriedel, MD  Phone for doctor or clinic: [facility] ([place], [place]), [address] [place] [id number] [phone]    ACT Score: 25      You can use the colors of a traffic light to help learn about your asthma medicines.      1. Green - Go! % of Personal Best Peak Flow Use controller medicine.   Breathing is good  No cough or wheeze  Can work and play Medicine How much to take When to take it    MONTELUKAST 10 MG Oral Tab       2. Yellow - Caution. 50-79% Personal Best Peak  Flow.  Use reliever medicine to keep an asthma attack from getting bad.   Cough  Wheezing  Tight Chest  Wake up at night Medicine How much to take When to take it           Additional instructions         3. Red - Stop! Danger!  <50% Personal Best Peak  Flow. Take these medications until  Get help from a doctor   Medicine not helping  Breathing is hard and fast  Nose opens wide  Can't walk  Ribs show  Can't talk well Medicine How much to take When to take it    Go to the nearest Emergency Room/Department right now!      Additional Instructions If your symptoms do not improve and you cannot contact your doctor, [facility] or call 911 immediately!     [x] Asthma Action Plan reviewed with patient (and caregiver if necessary) and a copy of the plan was given to the patient/caregiver.   [] Asthma Action Plan reviewed with patient (and caregiver if necessary) on the phone and mailed copy to patient or submitted via "[employer].     Signatures:  Provider  Adam Schriedel, MD   Patient Caretaker

## (undated) NOTE — LETTER
07/16/21        [facility] [address]  [place] [address]      Dear Karly Monday records indicate that you have outstanding lab work and or testing that was ordered for you and has not yet been completed:  Orders Placed This Encou

## (undated) NOTE — LETTER
[facility] CARE AT [facility] [address]  [place] [id number]  Dept: [phone]  Dept Fax: [phone]         March 28, 2019    Patient: Eva Last   YOB: 1995   Date of Visit: 3/28/2019       To Whom It May

## (undated) NOTE — LETTER
06/21/23    Re: Kevin Jaycekimberly, 1/20/1995      Dear Surgical Team,    I am witting on behalf of my patient , Spring Jerome, who uses he pronoun and identifies as he , whom I would like to refer to gender affirming surgery . I have been working with Emile Bose for about 12 years at [place] [address]. Emile Bose remembered knowing he was male since childhood. He began socially transitioning into adulthood by changing his name, pronouns, and physically presenting in a way that is accordance to his gender identity. He has been consistently on hormone therapy for 10 years, and has reported a positive experience. Despite these interventions, he reports significant anxiety, depression, and distress due to his experience of Dysphoria. By my independent evaluation of Emile Bose, I diagnosed him with Gender Dysphoria ( ICD -10 F64.1) for 12 years. Emile Bose has expressed persistent desire for [facility] phalloplasty. Surgery will address his Gender  Dysphoria by allowing him to experience his body in a way that is in accordance with his gender identity. Evan's complete medication list is below. He has a past medical history of obesity, hypothyroidism, asthma, adhd, anxiety, depression, bipolar that have all improved and are well managed due to significant lifestyle modifications he has made for improved health, along with continued compliance with medications and appropriate follow up with physicians and care providers. He has no other pertinent past medical history, is not a smoker, and does not partake in any drug use. Emile Bose has demonstrated an understanding of the permanence, costs, recovery time, and possible complications of this surgical gender affirmation surgery and is fully capable of making an informed consent about surgery. Emile Bose is reasonably expected to follow pre- and post-surgical treatment recommendations responsibly. It is my professional opinion that this surgery is medically necessary as stated above.     Please feel free to contact me with any concerns/questions.      Sincerely,         Milo Underwood MD

## (undated) NOTE — ED AVS SNAPSHOT
Christopher Porter   MRN: [id number]    Department:  [facility] Emergency Department   Date of Visit:  3/3/2019           Disclosure     Insurance plans vary and the physician(s) referred by the ER may not be covered by your plan.  Please contact yo tell this physician (or your personal doctor if your instructions are to return to your personal doctor) about any new or lasting problems. The primary care or specialist physician will see patients referred from the [facility] Emergency Department.  Sridevi Murray

## (undated) NOTE — LETTER
ASTHMA ACTION PLAN for Anish Rasmussen     : 1995     Date: 3/1/2019  Provider:  Ar Link MD  Phone for doctor or clinic: [facility], [address] [place] [address] [phone]

## (undated) NOTE — LETTER
[facility] [address], [address]  [facility] [address]  [place] [address] [facility]  1/20/1995  [address] [facility] [address]  [address] [address]            To whom it may concern,                Lukas Marx has been seen by Dr. Bharat Guardado regarding his diagnosis of Covid-19. Lukas Marx started symptoms on 04/12/2022, and tested positive on 04/18/2022. Per the CDC guidelines, Lukas Marx is out of his isolation period, and can return to work on Saturday, April 23, 2022.                        Sincerely,     Dr. Bharat Guardado ([place] [address]) and Clau Watkins RN

## (undated) NOTE — Clinical Note
Date: 4/25/2017    Patient Name: Jose Davison          To Whom it may concern: This letter has been written at the patient's request. The above patient was seen at the [facility] for treatment of a medical condition.     This patient s

## (undated) NOTE — LETTER
Date: 10/19/2022    Patient Name: Jamar Champion          To Whom it may concern: This letter has been written at the patient's request. The above patient was seen at the [facility] for treatment of a medical condition. This patient should be excused from attending work on 10/19/22.           Sincerely,        Aristides Cruz PA-C

## (undated) NOTE — LETTER
ASTHMA ACTION PLAN for Anish Rasmussen     : 1995     Date: 3/6/2018  Provider:  Ar Link MD  Phone for doctor or clinic: [facility], [address] [facility], [address] [place] [address] [phone] Gregorio Alvarado MD   Patient Caretaker

## (undated) NOTE — ED AVS SNAPSHOT
[facility] Emergency Department    [place]  [address] [place] [address]    Phone:  [phone]    Fax:  [phone]           Federica Mt   MRN: [id number]    Department:  [facility] Emergency Department   Date of Visit:  5/ IF THERE IS ANY CHANGE OR WORSENING OF YOUR CONDITION, CALL YOUR PRIMARY CARE PHYSICIAN AT ONCE OR RETURN IMMEDIATELY TO THE EMERGENCY DEPARTMENT.     If you have been prescribed any medication(s), please fill your prescription right away and begin taking t

## (undated) NOTE — LETTER
ASTHMA ACTION PLAN for Lalla Lundborg     : 1995     Date: 3/23/2022  Provider:  Ricardo Mc MD  Phone for doctor or clinic: [address] [facility] [address] [place] [id number] [phone]    ACT Score: 22      You can use the colors of a traffic light to help learn about your asthma medicines. 1. Green - Go! % of Personal Best Peak Flow Use controller medicine. Breathing is good  No cough or wheeze  Can work and play Medicine How much to take When to take it    MONTELUKAST 10 MG Oral Tab  TAKE 1 TABLET BY MOUTH EVERY DAY           2. Yellow - Caution. 50-79% Personal Best Peak  Flow. Use reliever medicine to keep an asthma attack from getting bad. Cough  Wheezing  Tight Chest  Wake up at night Medicine How much to take When to take it    ALBUTEROL SULFATE (2.5 MG/3ML) 0.083% Inhalation Nebu Soln  USE 1 AMPULE IN NEBULIZER EVERY SIX HOURS AS NEEDED FOR WHEEZING     VENTOLIN  (90 Base) MCG/ACT Inhalation Aero Soln  INHALE 2 PUFFS ORALLY EVERY FOUR HOURS AS NEEDED FOR WHEEZING            Additional instructions         3. Red - Stop! Danger!  <50% Personal Best Peak  Flow. Take these medications until  Get help from a doctor   Medicine not helping  Breathing is hard and fast  Nose opens wide  Can't walk  Ribs show  Can't talk well Medicine How much to take When to take it    Go to the nearest Emergency Room/Department right now! Additional Instructions If your symptoms do not improve and you cannot contact your doctor, [facility] or call 911 immediately! [x] Asthma Action Plan reviewed with patient (and caregiver if necessary) and a copy of the plan was given to the patient/caregiver. [] Asthma Action Plan reviewed with patient (and caregiver if necessary) on the phone and mailed copy to patient or submitted via [address]      Signatures:  Provider  Ricardo Mc MD   Patient Caretaker